# Patient Record
Sex: MALE | Race: BLACK OR AFRICAN AMERICAN | Employment: FULL TIME | URBAN - METROPOLITAN AREA
[De-identification: names, ages, dates, MRNs, and addresses within clinical notes are randomized per-mention and may not be internally consistent; named-entity substitution may affect disease eponyms.]

---

## 2019-04-01 ENCOUNTER — HOSPITAL ENCOUNTER (OUTPATIENT)
Dept: GENERAL RADIOLOGY | Age: 39
Discharge: HOME | End: 2019-04-01
Payer: COMMERCIAL

## 2019-04-01 DIAGNOSIS — S16.1XXA CERVICAL STRAIN: ICD-10-CM

## 2019-04-01 PROCEDURE — 72050 X-RAY EXAM NECK SPINE 4/5VWS: CPT

## 2019-04-09 ENCOUNTER — HOSPITAL ENCOUNTER (OUTPATIENT)
Dept: PHYSICAL THERAPY | Age: 39
Discharge: HOME | End: 2019-04-09
Payer: COMMERCIAL

## 2019-04-09 PROCEDURE — 97161 PT EVAL LOW COMPLEX 20 MIN: CPT

## 2019-04-09 PROCEDURE — 97110 THERAPEUTIC EXERCISES: CPT

## 2019-04-09 NOTE — PROGRESS NOTES
Request for use of Dry Needling/Intramuscular Manual Therapy Patient: Adonis Hagen     Referral Source: Kylee Gilbert MD 
Diagnosis: Neck pain [M54.2]      : 1980 Date of initial visit: 2019   Attended visits: 1  Missed Visits: 0 Based on findings from the physical therapy examination and evaluation, the evaluating therapist believes the patient, Adonis Hagen  would benefit from including Dry Needling as part of the plan of care. Dry needling is a treatment technique utilized in conjunction with other PT interventions to inactivate myofascial trigger points and the pain and dysfunction they cause. Dry Needling is an advanced procedure that requires additional training including greater than 54 hours of intensive course work. Physical Therapists at [address] [place] [address] are trained and/or certified through [employer] for their education. PROCEDURE: 
? Solid filament sterile needle (typically 0.3mm/30 gauge) inserted into a trigger point ? Repeated movements inactivate the trigger points, taking 30-60 seconds per site ? Typically consists of 1 dry needling session per week and a possible second treatment including muscle re-education, flexibility, strengthening and other manual techniques to facilitate the benefits of dry needling BENEFITS: 
? Inactivation of trigger points ? Decreased pain ? Increased muscle length ? Improved movement patterns ? Restoration of function POTENTIAL RISKS: 
? Post-needling soreness ? Infection ? Bruising/bleeding ? Penetration of a nerve ? Pneumothorax All treating PTs have been thoroughly educated in avoiding adverse reactions If you agree with this recommendation, please sign this form and fax it to us at [phone]. If you have questions or concerns regarding dry needling or any other treatment we may be providing, please contact us at [phone]. Thank you for allowing us to assist in the care of your patient. Chandrika Boucher, PT, DPT, ATC    4/9/2019 6:58 PM  
NOTE TO PHYSICIAN:  PLEASE COMPLETE THE ORDERS BELOW AND  
FAX TO [facility]: [phone] If you are unable to process this request in 24 hours please contact our office:  
[phone] I have read the above request and AGREE to the recommendation of including dry needling as part of the plan of care. Physicians signature: _________________________Date: _________Time:________

## 2019-04-09 NOTE — PROGRESS NOTES
PT DAILY TREATMENT NOTE/CERVICAL [id number] Patient Name: Herman Presley Date:2019 : 1980 [x]  Patient  Verified Payor: Yogesh Walker / Plan: [place] [employer] PPO / Product Type: PPO / In time:4:40pm  Out time:5:17pm 
Total Treatment Time (min): 37 Visit #: 1 of 8 Medicare/BCBS Only Total Timed Codes (min):  16 1:1 Treatment Time:  37 Treatment Area: Neck pain [M54.2] SUBJECTIVE Pain Level (0-10 scale): 3 
[]constant []intermittent []improving []worsening []no change since onset Any medication changes, allergies to medications, adverse drug reactions, diagnosis change, or new procedure performed?: [x] No    [] Yes (see summary sheet for update) Subjective functional status/changes:    
Pt reports increased neck pain starting several weeks ago that he believes was d/t moving his aunt out of her home or possibly d/t sleeping on the couch. He reports working, pushing, pulling will sometimes exacerbate his pain and reports general neck aching pain and stiffness. He does have a history significant for an upper cervical fracture with fixation hardware @ C2 and intermittent neck pain in the past associated with PT and dry needling several years ago that provided relief. He reports recent x-ray was mostly unremarkable but CT was ordered to provide more detail to make sure his hardware is ok (upcoming 2019). No treatment to date for current episode with exception of muscle relaxants that he reports he has not been using much. PLOF: working at the [employer], occasional neck pain, stiffness Limitations to PLOF: similar, limited lifting and pushing 2/2 concerns regarding neck Mechanism of Injury: see above Current symptoms/Complaints: see above Previous Treatment/Compliance: PCP, muscle relaxants PMHx/Surgical Hx: Hx cervical fracture 16 years ago, PT 3-4 years ago for neck pain with dry needling with good relief reported Work Hx: see intake Living Situation:  
Pt Goals: see intake Barriers: []pain []financial []time []transportation []other Motivation: appears motivated and cooperative Substance use: []Alcohol []Tobacco []other:  
FABQ Score: []low []elevate Cognition: A & O x 4    Other: OBJECTIVE/EXAMINATION Domestic Life:  
Activity/Recreational Limitations:  
Mobility:  
Self Care:  
 
21 min [x]Eval                  []Re-Eval  
 
10 min Therapeutic Exercise:  [x] See flow sheet :  
Rationale: increase ROM and increase strength to improve the patients ability to improve ease of daily activity. Self cAre: 6 minutes pt education regarding relevant anatomy, diagnosis, prognosis, plan of care, activity ,modification, self modality use. With 
 [] TE 
 [] TA 
 [] neuro 
 [] other: Patient Education: [x] Review HEP [] Progressed/Changed HEP based on:  
[] positioning   [] body mechanics   [] transfers   [] heat/ice application   
[] other:   
 
Other Objective/Functional Measures:  
 
Physical Therapy Evaluation Cervical Spine SUBJECTIVE Chief Complaint: 
 
Mechanism of injury: 
 
Symptoms Aggravated by: 
 [] Bending [] Sitting [] Standing [] Reaching Overhead 
 [x] Moving [] Cough [] Sneeze [] Eating 
 [] AM  [] PM  Lying:  [] sup   [] pro   [] sidelying 
 [] Other: 
  
Eased by:  
 [] Bending [] Sitting [] Standing Lying: [x] sup  [] pro  [] sidelying 
 [] Moving [] AM  [] PM  [] Other: 
  
General Health:  Red Flags Indicated? [] Yes    [x] No 
[] Yes [] No Recent weight change (If yes, due to dieting? [] Yes  [] No) [] Yes [] No Persistent cough 
[] Yes [] No Unremitting pain at night 
[] Yes [] No Dizziness 
[] Yes [] No Blurred vision 
[] Yes [] No Hands more cold or painful in cold weather 
[] Yes [] No Ringing in ears 
[] Yes [] No Difficulty swallowing 
[] Yes [] No Dysfunction of bowel or bladder 
[] Yes [] No Recent illness within past 3 weeks (i.e, cold, flu) 
[] Yes [] No Jaw pain Past History/Treatments: Diagnostic Tests: [] Lab work [x] X-rays    [] CT [] MRI     [] Other: 
Results: 4/1/2019 \"IMPRESSION: 
1. No convincing evidence for acute fracture or malalignment. 
-The fixation screw at the C2 vertebral body may encroach the posterior cortex 
at the level of the dens. This may be better evaluated with CT scan. 
  
2. Mild degenerative findings are as described. 
  
3. Straightening of the usual cervical lordosis, possibly positional or due to 
muscle strain/spasm. \"  
 
Functional Status Prior level of function: 
Present functional limitations: 
What position do you sleep in?: 
 
Headaches: Do you have headaches? [] Yes   [x] No 
How often do you get headaches? How long does the headache last? 
What aggravates it? What relieves it? Does the headache coincide with any other symptoms (visual disturbances, light sensitivity)? Where is the headache? Does it change locations? Other: OBJECTIVEPosture: [] WNL Head Position: forward head Shoulder/Scapular Position: 
C-Kyphosis:  [] increased   [] decreased C-Lordosis:   [] increased   [] decreased T-Kyphosis:  [] increased   [] decreased T-Lordosis:   [] increased   [] decreased TMJ: [] N/A [] Abnormal - ROM: 
 Palpation: 
 
Cervical Retraction: [] WNL    [] Abnormal: 
 
Shoulder/Scapular Screen: [x] WNL    [] Abnormal: 
 
Active Movements: [] N/A   [] Too acute   [] Other: ROM % AROM % PROM Comments:pain, area Forward flexion 38 Extension 36 SB right 28  Discomfort and stiffness SB left  26  Discomfort and stiffness Rotation right 54  Discomfort and stiffness Rotation left 50 Thoracic Spine: [] N/A    [x] WNL   [] Other: PROM: 
 
Palpation: 
[x] Min  [] Mod  [] Severe    Location: B cervical suboccipitals, scalenes, upper trap, levator scapulae, increased tone/turgor noted bilat 
[] Min  [] Mod  [] Severe    Location:  
[] Min  [] Mod  [] Severe    Location:  
 
Neuro Screen (myotome/dematome/felexes): [x] WNL Myotome Level Muscle Test Myotome Level Muscle Test  
C5 Shoulder Adduction - Deltoid C8 Finger Flexors C6 Wrist Extension T1 Finger Abduction - Interossei C7 Elbow Extension Comments: 
Upper Limb Tension Tests: [] N/A Ulnar: [] R    [] L    [] +    [x] - Median: [] R    [] L    [] +    [x] - Radial: [] R    [] L    [] +    [x] - Special Tests:  Cervical:  
     Vertebral Artery:  [] R    [] L    [] +    [] - Alar Ligament: [] R    [] L    [] +    [x] - Transverse Lig: [] R    [] L    [] +    [x] - Spurling's:  [] R    [] L    [] +    [x] - Distraction:  [] R    [] L    [] +    [x] - Compression: [] R    [] L    [] +    [x] - Thoracic Outlet Tests: [] N/A Adson's:  [] R    [] L    [] +    [] - Hyperabduction: [] R    [] L    [] +    [] - Javid's:  [] R    [] L    [] +    [] - Rashawn Curie:  [] R    [] L    [] +    [] - 
 
Diaphragmatic Breathing: [] Normal    [] Abnormal 
 
Muscle Flexibility: [] N/A Scalenes: [] WNL    [x] Tight    [x] R    [x] L Upper Trap: [] WNL    [x] Tight    [x] R    [x] L Levator: [] WNL    [x] Tight    [x] R    [x] L Pect. Minor: [] WNL    [] Tight    [] R    [] L Global Muscular Weakness: [] N/A Lower Trap:4+/5 Rhomboids:4+/5 Middle Trap: 4+/5 Serratus Ant: 5/5 Ext Rotators: 5/5 bilat Other: 
 
Other tests/comments: 
  
 
Pain Level (0-10 scale) post treatment: 3 
 
ASSESSMENT/Changes in Function: Per POC. Patient will continue to benefit from skilled PT services to modify and progress therapeutic interventions, address functional mobility deficits, address ROM deficits, analyze and address soft tissue restrictions, analyze and cue movement patterns and instruct in home and community integration to attain remaining goals. [x]  See Plan of Care 
[]  See progress note/recertification 
[]  See Discharge Summary Progress towards goals / Updated goals: 
Per POC.   
 
PLAN 
 [x]  Upgrade activities as tolerated     [x]  Continue plan of care 
[]  Update interventions per flow sheet      
[]  Discharge due to:_ 
[x]  Other:_ Myofascial interventions, gentle ROM, periscapular strength and mobility, DN PRN.   
 
Bridgett Essex, PT, DPT, ATC 4/9/2019  4:48 PM

## 2019-04-09 NOTE — PROGRESS NOTES
[address]  [address], [address] 
[phone] fax Plan of Care/ Statement of Necessity for Physical Therapy Services Patient name: Roni Mcmillan Start of Care: 2019 Referral source: Pedro Johnson MD : 1980 Medical Diagnosis: Neck pain [M54.2] Payor: Alix Pilon / Plan: [employer]  CAPITATED PT / Product Type: Commerical /  Onset Date:several weeks ago exacerbation Treatment Diagnosis: neck pain Prior Hospitalization: see medical history Provider#: [id number] Medications: Verified on Patient summary List  
 Comorbidities: Hx cervical fracture 16 years ago, PT 3-4 years ago for neck pain with dry needling with good relief reported Prior Level of Function: working at the [employer], occasional neck pain, stiffness The Plan of Care and following information is based on the information from the initial evaluation. Assessment/ key information: Pt is a 37year old male who reports increased neck pain starting several weeks ago that he believes was d/t moving his aunt out of her home or possibly d/t sleeping on the couch. He reports working, pushing, pulling will sometimes exacerbate his pain and reports general neck aching pain and stiffness. He does have a history significant for an upper cervical fracture with fixation hardware @ C2 and intermittent neck pain in the past associated with PT and dry needling several years ago that provided relief. Pt presents with signs and symptoms consistent with myofascial cervical pain with TTP Bilat cervical paraspinals, limited global cervical ROM. Pt will benefit from skilled PT management to address their impairments and improve their level of function.  Pt may benefit from use of dry needling, particularly given his positive response in the past. 
 
Evaluation Complexity History MEDIUM  Complexity : 1-2 comorbidities / personal factors will impact the outcome/ POC ; Examination LOW Complexity : 1-2 Standardized tests and measures addressing body structure, function, activity limitation and / or participation in recreation  ;Presentation LOW Complexity : Stable, uncomplicated  ;Clinical Decision Making MEDIUM Complexity : FOTO score of 26-74 Overall Complexity Rating: LOW Problem List: pain affecting function, decrease ROM, decrease activity tolerance and decrease flexibility/ joint mobility Treatment Plan may include any combination of the following: Therapeutic exercise, Therapeutic activities, Neuromuscular re-education, Physical agent/modality, Manual therapy, Patient education and Self Care training Patient / Family readiness to learn indicated by: asking questions, trying to perform skills and interest 
Persons(s) to be included in education: patient (P) Barriers to Learning/Limitations: None Patient Goal (s): Pain relief.  Patient Self Reported Health Status: good Rehabilitation Potential: good Short Term Goals: To be accomplished in 2 weeks: 1. Patient will report performance of HEP at least 2 times per day to facilitate improved outcomes and improved self management. Long Term Goals: To be accomplished in 4 weeks: 1. Patient will report FOTO score of 62 or better to indicate significant improvement in functional status. 2. Pt will demonstrate 55 degrees bilat cervical rotation AROM to improve ease of daily activity 3. Pt will report pain no greater than 1/10 with work tasks to facilitate full return to work duties. Frequency / Duration: Patient to be seen 2 times per week for 4 weeks. Patient/ Caregiver education and instruction: Diagnosis, prognosis, self care, activity modification and exercises 
 [x]  Plan of care has been reviewed with PTA Max Notice, PT, DPT, ATC 4/9/2019 6:28 PM 
 
________________________________________________________________________ I certify that the above Therapy Services are being furnished while the patient is under my care. I agree with the treatment plan and certify that this therapy is necessary. [de-identified] Signature:____________Date:_________TIME:________ 
 
[employer], Date and Time must be completed for valid certification ** Please sign and return to [address] [place], [address] 
[phone] fax

## 2019-04-11 ENCOUNTER — HOSPITAL ENCOUNTER (OUTPATIENT)
Dept: CT IMAGING | Age: 39
Discharge: HOME | End: 2019-04-11
Attending: PHYSICIAN ASSISTANT
Payer: COMMERCIAL

## 2019-04-11 DIAGNOSIS — Z98.890 OTHER SPECIFIED POSTPROCEDURAL STATES: ICD-10-CM

## 2019-04-11 PROCEDURE — 72125 CT NECK SPINE W/O DYE: CPT

## 2019-04-19 ENCOUNTER — HOSPITAL ENCOUNTER (OUTPATIENT)
Dept: PHYSICAL THERAPY | Age: 39
End: 2019-04-19
Payer: COMMERCIAL

## 2019-04-23 ENCOUNTER — HOSPITAL ENCOUNTER (OUTPATIENT)
Dept: PHYSICAL THERAPY | Age: 39
Discharge: HOME | End: 2019-04-23
Payer: COMMERCIAL

## 2019-04-23 PROCEDURE — 97110 THERAPEUTIC EXERCISES: CPT

## 2019-04-23 PROCEDURE — 97140 MANUAL THERAPY 1/> REGIONS: CPT

## 2019-04-23 NOTE — PROGRESS NOTES
PT DAILY TREATMENT NOTE  Patient Name: Reginaldo Alfredo Date:2019 : 1980 [x]  Patient  Verified Payor: Rosie Pollock / Plan: [address] [place] [address] Rd PT / Product Type: Commerical / In time:4:30  Out time:5:09 Total Treatment Time (min): 39 Visit #: 2 of 8 Treatment Area: Neck pain [M54.2] SUBJECTIVE Pain Level (0-10 scale): 1/10 Any medication changes, allergies to medications, adverse drug reactions, diagnosis change, or new procedure performed?: [x] No    [] Yes (see summary sheet for update) Subjective functional status/changes:   [] No changes reported \"I just woke up and took some pain pills, doing okay right now. \" OBJECTIVE Modality rationale: decrease pain and increase tissue extensibility to improve the patients ability to perform ADL's. Min Type Additional Details  
 [] Estim:  []Unatt       []IFC  []Premod []Other:  []w/ice   []w/heat Position: Location:  
 [] Estim: []Att    []TENS instruct  []NMES []Other:  []w/US   []w/ice   []w/heat Position: Location:  
 []  Traction: [] Cervical       []Lumbar 
                     [] Prone          []Supine []Intermittent   []Continuous Lbs: 
[] before manual 
[] after manual  
 []  Ultrasound: []Continuous   [] Pulsed []1MHz   []3MHz W/cm2: 
Location:  
 []  Iontophoresis with dexamethasone Location: [] Take home patch  
[] In clinic  
10 []  Ice     [x]  heat 
[]  Ice massage 
[]  Laser  
[]  Anodyne Position: Supine Location: C/S  
 []  Laser with stim 
[]  Other:  Position: Location:  
 []  Vasopneumatic Device Pressure:       [] lo [] med [] hi  
Temperature: [] lo [] med [] hi  
[] Skin assessment post-treatment:  []intact []redness- no adverse reaction 
  []redness  adverse reaction:  
 
21 min Therapeutic Exercise:  [x] See flow sheet :  
Rationale: increase ROM, increase strength and increase proprioception to improve the patients ability to perform ADL's and functional activities. 8 min Manual Therapy:  DTM (B) UT, lev scap, STM (B) C/S paraspinals. Lower C/S PA mobs. Rationale: decrease pain, increase ROM, increase tissue extensibility and decrease trigger points to improve ease with functional activities. With 
 [x] TE 
 [] TA 
 [] neuro 
 [] other: Patient Education: [x] Review HEP [] Progressed/Changed HEP based on:  
[] positioning   [] body mechanics   [] transfers   [] heat/ice application   
[] other:   
 
Other Objective/Functional Measures: Initiated exercises per flow sheet. Cueing throughout for proper exercise mechanics and posture. Pain Level (0-10 scale) post treatment: 1/10 ASSESSMENT/Changes in Function: First F/U visit. Compensatory (B) UT recruitment with several exercises. Patient will continue to benefit from skilled PT services to modify and progress therapeutic interventions, address functional mobility deficits, address ROM deficits, address strength deficits, analyze and address soft tissue restrictions and analyze and modify body mechanics/ergonomics to attain remaining goals. [x]  See Plan of Care 
[]  See progress note/recertification 
[]  See Discharge Summary Progress towards goals / Updated goals: 
 
Short Term Goals: To be accomplished in 2 weeks: 1. Patient will report performance of HEP at least 2 times per day to facilitate improved outcomes and improved self management. - Pt reports HEP compliance. 4/23/2019 
  
Long Term Goals: To be accomplished in 4 weeks: 1. Patient will report FOTO score of 62 or better to indicate significant improvement in functional status. 2. Pt will demonstrate 55 degrees bilat cervical rotation AROM to improve ease of daily activity 3. Pt will report pain no greater than 1/10 with work tasks to facilitate full return to work duties.  
 
PLAN 
 []  Upgrade activities as tolerated     [x]  Continue plan of care  
[]  Update interventions per flow sheet      
[]  Discharge due to:_ 
[]  Other:_   
 
Thao Vogel PTA 4/23/2019  4:23 PM 
 
Future Appointments Date Time Provider Roc Gonsales 4/23/2019  4:30 PM Alicia Zarco PTA [facility]  
4/26/2019  3:30 PM Jean Claude, [address] [facility]  
4/30/2019  4:30 PM Alicia Zarco PTA [facility]  
5/2/2019  4:30 PM Samantha Long [facility]  
5/7/2019  4:30 PM Caterina Myers [facility]  
5/9/2019  4:30 PM Samantha [facility]

## 2019-04-26 ENCOUNTER — HOSPITAL ENCOUNTER (OUTPATIENT)
Dept: PHYSICAL THERAPY | Age: 39
Discharge: HOME | End: 2019-04-26
Payer: COMMERCIAL

## 2019-04-26 PROCEDURE — 97110 THERAPEUTIC EXERCISES: CPT

## 2019-04-26 PROCEDURE — 97140 MANUAL THERAPY 1/> REGIONS: CPT

## 2019-04-26 PROCEDURE — 97112 NEUROMUSCULAR REEDUCATION: CPT

## 2019-04-26 NOTE — PROGRESS NOTES
PT DAILY TREATMENT NOTE  Patient Name: Azar Last Date:2019 : 1980 [x]  Patient  Verified Payor: Russell Lisa / Plan: [address] PT / Product Type: Commerical / In time:3:30  Out time:4:02 Total Treatment Time (min): 32 Visit #: 3 of 8 Treatment Area: Neck pain [M54.2] SUBJECTIVE Pain Level (0-10 scale): 1 Any medication changes, allergies to medications, adverse drug reactions, diagnosis change, or new procedure performed?: [x] No    [] Yes (see summary sheet for update) Subjective functional status/changes:   [] No changes reported \"Feeling alright\" pt reports no adverse response after last session OBJECTIVE Modality rationale: PD to improve the patients ability to Min Type Additional Details  
 [] Estim:  []Unatt       []IFC  []Premod []Other:  []w/ice   []w/heat Position: Location:  
 [] Estim: []Att    []TENS instruct  []NMES []Other:  []w/US   []w/ice   []w/heat Position: Location:  
 []  Traction: [] Cervical       []Lumbar 
                     [] Prone          []Supine []Intermittent   []Continuous Lbs: 
[] before manual 
[] after manual  
 []  Ultrasound: []Continuous   [] Pulsed []1MHz   []3MHz W/cm2: 
Location:  
 []  Iontophoresis with dexamethasone Location: [] Take home patch  
[] In clinic  
 []  Ice     []  heat 
[]  Ice massage 
[]  Laser  
[]  Anodyne Position: Location:  
 []  Laser with stim 
[]  Other:  Position: Location:  
 []  Vasopneumatic Device Pressure:       [] lo [] med [] hi  
Temperature: [] lo [] med [] hi  
[] Skin assessment post-treatment:  []intact []redness- no adverse reaction 
  []redness  adverse reaction:  
 
 
14 min Therapeutic Exercise:  [] See flow sheet :  
Rationale: increase ROM and increase strength to improve the patients ability to perform daily tasks and self care 10 min Neuromuscular Re-education:  []  See flow sheet :  
Rationale: improve coordination and increase proprioception  to improve the patients ability to perform ADLs with improved scapular stability 8 min Manual Therapy:  TPR left levator scap, T/S PA's, UPA's and rib springs, left scap mobs Rationale: increase ROM and increase tissue extensibility to improve ease of ADL performance With 
 [] TE 
 [] TA 
 [] neuro 
 [] other: Patient Education: [x] Review HEP [] Progressed/Changed HEP based on:  
[] positioning   [] body mechanics   [] transfers   [] heat/ice application   
[] other:   
 
Other Objective/Functional Measures: pt challenged maintaining SA with QP alternating UE lift Pain Level (0-10 scale) post treatment: 1 ASSESSMENT/Changes in Function: Pt with reports of improved muscle tension following sessions. He still reports feeling tight through lower cervical spine and thoracic region. Re-sent DN request today as pt would benefit through C-T region Patient will continue to benefit from skilled PT services to modify and progress therapeutic interventions, address functional mobility deficits, address ROM deficits, address strength deficits, analyze and address soft tissue restrictions, analyze and cue movement patterns and analyze and modify body mechanics/ergonomics to attain remaining goals. []  See Plan of Care 
[]  See progress note/recertification 
[]  See Discharge Summary Progress towards goals / Updated goals: 
Short Term Goals: To be accomplished in 2 weeks: 
             1. Patient will report performance of HEP at least 2 times per day to facilitate improved outcomes and improved self management. - Pt reports HEP compliance. 4/23/2019 
  
Long Term Goals: To be accomplished in 4 weeks: 
             1. Patient will report FOTO score of 62 or better to indicate significant improvement in functional status.              2. Pt will demonstrate 55 degrees bilat cervical rotation AROM to improve ease of daily activity              3. Pt will report pain no greater than 1/10 with work tasks to facilitate full return to work duties. PLAN 
[]  Upgrade activities as tolerated     [x]  Continue plan of care 
[]  Update interventions per flow sheet      
[]  Discharge due to:_ 
[]  Other:_ Kevin Jean DPT, CMTPT 4/26/2019  3:38 PM 
 
Future Appointments Date Time Provider Roc Gonsales 4/30/2019  4:30 PM [facility]  
5/2/2019  4:30 PM [facility]  
5/7/2019  4:30 PM Paola Yip PTA [facility]  
5/9/2019  4:30 PM [facility]

## 2019-04-26 NOTE — PROGRESS NOTES
Request for use of Dry Needling/Intramuscular Manual Therapy Patient: Roni Mcmillan     Referral Source: Pedro Johnson MD 
Diagnosis: Neck pain [M54.2]      : 1980 Date of initial visit: 2019   Attended visits: 3  Missed Visits: 0 Based on findings from the physical therapy examination and evaluation, the evaluating therapist believes the patient, Roni Mcmillan  would benefit from including Dry Needling as part of the plan of care. Dry needling is a treatment technique utilized in conjunction with other PT interventions to inactivate myofascial trigger points and the pain and dysfunction they cause. Dry Needling is an advanced procedure that requires additional training including greater than 54 hours of intensive course work. Physical Therapists at [address] [place] [address] are trained and/or certified through [employer] for their education. PROCEDURE: 
? Solid filament sterile needle (typically 0.3mm/30 gauge) inserted into a trigger point ? Repeated movements inactivate the trigger points, taking 30-60 seconds per site ? Typically consists of 1 dry needling session per week and a possible second treatment including muscle re-education, flexibility, strengthening and other manual techniques to facilitate the benefits of dry needling BENEFITS: 
? Inactivation of trigger points ? Decreased pain ? Increased muscle length ? Improved movement patterns ? Restoration of function POTENTIAL RISKS: 
? Post-needling soreness ? Infection ? Bruising/bleeding ? Penetration of a nerve ? Pneumothorax All treating PTs have been thoroughly educated in avoiding adverse reactions If you agree with this recommendation, please sign this form and fax it to us at [phone]. If you have questions or concerns regarding dry needling or any other treatment we may be providing, please contact us at [phone]. Thank you for allowing us to assist in the care of your patient. Timothy Edgar    4/26/2019 4:01 PM  
NOTE TO PHYSICIAN:  PLEASE COMPLETE THE ORDERS BELOW AND  
FAX TO [facility]: [phone] If you are unable to process this request in 24 hours please contact our office:  
[phone] I have read the above request and AGREE to the recommendation of including dry needling as part of the plan of care. Physicians signature: _________________________Date: _________Time:________

## 2019-04-30 ENCOUNTER — HOSPITAL ENCOUNTER (OUTPATIENT)
Dept: PHYSICAL THERAPY | Age: 39
Discharge: HOME | End: 2019-04-30
Payer: COMMERCIAL

## 2019-04-30 PROCEDURE — 97140 MANUAL THERAPY 1/> REGIONS: CPT

## 2019-04-30 PROCEDURE — 97110 THERAPEUTIC EXERCISES: CPT

## 2019-04-30 NOTE — PROGRESS NOTES
PT DAILY TREATMENT NOTE  Patient Name: Geovanny Morgan Date:2019 : 1980 [x]  Patient  Verified Payor: Gabriella Hutchinson / Plan: [address] [place] [address] Rd PT / Product Type: Commerical / In time:4:36  Out time:5:19 Total Treatment Time (min): 43 Visit #: 4 of 8 Treatment Area: Neck pain [M54.2] SUBJECTIVE Pain Level (0-10 scale): 1/10 Any medication changes, allergies to medications, adverse drug reactions, diagnosis change, or new procedure performed?: [x] No    [] Yes (see summary sheet for update) Subjective functional status/changes:   [] No changes reported \"It's doing a bit better, I've been taking it easy at work. \" OBJECTIVE Modality rationale: decrease pain and increase tissue extensibility to improve the patients ability to perform ADL's. Min Type Additional Details  
 [] Estim:  []Unatt       []IFC  []Premod []Other:  []w/ice   []w/heat Position: Location:  
 [] Estim: []Att    []TENS instruct  []NMES []Other:  []w/US   []w/ice   []w/heat Position: Location:  
 []  Traction: [] Cervical       []Lumbar 
                     [] Prone          []Supine []Intermittent   []Continuous Lbs: 
[] before manual 
[] after manual  
 []  Ultrasound: []Continuous   [] Pulsed []1MHz   []3MHz W/cm2: 
Location:  
 []  Iontophoresis with dexamethasone Location: [] Take home patch  
[] In clinic  
10 []  Ice     [x]  heat 
[]  Ice massage 
[]  Laser  
[]  Anodyne Position: Prone Location: C/S  
 []  Laser with stim 
[]  Other:  Position: Location:  
 []  Vasopneumatic Device Pressure:       [] lo [] med [] hi  
Temperature: [] lo [] med [] hi  
[] Skin assessment post-treatment:  []intact []redness- no adverse reaction 
  []redness  adverse reaction:  
 
25 min Therapeutic Exercise:  [x] See flow sheet :  
Rationale: increase ROM, increase strength and increase proprioception to improve the patients ability to perform ADL's. 
 
8 min Manual Therapy:  STM/DTM (B) UT, lev scap, C/S paraspinals and suboccipitals. Rationale: decrease pain, increase ROM, increase tissue extensibility and decrease trigger points to improve ease of performing functional activities. With 
 [x] TE 
 [] TA 
 [] neuro 
 [] other: Patient Education: [x] Review HEP [] Progressed/Changed HEP based on:  
[] positioning   [] body mechanics   [] transfers   [] heat/ice application   
[] other:   
 
Other Objective/Functional Measures: Increased QP SA punch to 15 reps. Pain Level (0-10 scale) post treatment: 0/10 ASSESSMENT/Changes in Function: Demonstrates improved form with QP SA punches. Awaiting MD to sign off on Dry-needling. Pt reports decreased pain since IE. Patient will continue to benefit from skilled PT services to modify and progress therapeutic interventions, address functional mobility deficits, address ROM deficits, address strength deficits, analyze and address soft tissue restrictions and analyze and modify body mechanics/ergonomics to attain remaining goals. [x]  See Plan of Care 
[]  See progress note/recertification 
[]  See Discharge Summary Progress towards goals / Updated goals: 
Short Term Goals: To be accomplished in 2 weeks: 
             1. Patient will report performance of HEP at least 2 times per day to facilitate improved outcomes and improved self management. - Pt reports HEP compliance. 4/23/2019 
  
Long Term Goals: To be accomplished in 4 weeks: 
             1. Patient will report FOTO score of 62 or better to indicate significant improvement in functional status.              2. Pt will demonstrate 55 degrees bilat cervical rotation AROM to improve ease of daily activity              3. Pt will report pain no greater than 1/10 with work tasks to facilitate full return to work duties.  
 
PLAN 
 []  Upgrade activities as tolerated     [x]  Continue plan of care 
[]  Update interventions per flow sheet      
[]  Discharge due to:_ 
[]  Other:_   
 
Kelle Cartagena, PTA 4/30/2019  4:46 PM 
 
Future Appointments Date Time Provider Roc Gonsales 5/2/2019  4:30 PM Jena Claude, [address] [facility]  
5/7/2019  4:30 PM [facility]  
5/9/2019  4:30 PM Vishnu Brandt [facility]

## 2019-05-02 ENCOUNTER — HOSPITAL ENCOUNTER (OUTPATIENT)
Dept: PHYSICAL THERAPY | Age: 39
End: 2019-05-02
Payer: COMMERCIAL

## 2019-05-07 ENCOUNTER — HOSPITAL ENCOUNTER (OUTPATIENT)
Dept: PHYSICAL THERAPY | Age: 39
Discharge: HOME | End: 2019-05-07
Payer: COMMERCIAL

## 2019-05-07 PROCEDURE — 97140 MANUAL THERAPY 1/> REGIONS: CPT

## 2019-05-07 PROCEDURE — 97110 THERAPEUTIC EXERCISES: CPT

## 2019-05-07 NOTE — PROGRESS NOTES
PT DAILY TREATMENT NOTE 10-18 Patient Name: Antonio Rowe Date:2019 : 1980 [x]  Patient  Verified Payor: Alida Diamond / Plan: [address] [place] [address] Jason PT / Product Type: Commerical / In time:4:30  Out time:5:08 Total Treatment Time (min): 38 Visit #: 5 of 8 Treatment Area: Neck pain [M54.2] SUBJECTIVE Pain Level (0-10 scale): 1 Any medication changes, allergies to medications, adverse drug reactions, diagnosis change, or new procedure performed?: [x] No    [] Yes (see summary sheet for update) Subjective functional status/changes:   [] No changes reported Pt reports feeling like he is getting better OBJECTIVE 30 min Therapeutic Exercise:  [x] See flow sheet :  
Rationale: increase ROM, increase strength and increase proprioception to improve the patients ability to perform ADLs 8 min Manual Therapy:  STM/DTM (B) UT, lev scap, C/S paraspinals and suboccipitals. Rationale: decrease pain, increase ROM and increase tissue extensibility to improve functional mobility With 
 [] TE 
 [] TA 
 [] neuro 
 [] other: Patient Education: [x] Review HEP [] Progressed/Changed HEP based on:  
[] positioning   [] body mechanics   [] transfers   [] heat/ice application   
[] other:   
 
Other Objective/Functional Measures: AROM c/s rot 55* bilaterally void of pain and limited endurance 
 
incr'd reps per flow sheet Pain Level (0-10 scale) post treatment: 0 
 
ASSESSMENT/Changes in Function: Pt performed well with therex today without incr'd pain. Pt demo's improved CROM void of pain but demo's limited endurance while attempting to maintain testing position.   
 
Patient will continue to benefit from skilled PT services to modify and progress therapeutic interventions, address functional mobility deficits, address ROM deficits, address strength deficits, analyze and address soft tissue restrictions, analyze and cue movement patterns and assess and modify postural abnormalities to attain remaining goals. []  See Plan of Care 
[]  See progress note/recertification 
[]  See Discharge Summary Progress towards goals / Updated goals: 
Short Term Goals: To be accomplished in 2 weeks: 
             1. Patient will report performance of HEP at least 2 times per day to facilitate improved outcomes and improved self management. - Pt reports HEP compliance. 4/23/2019 
  
Long Term Goals: To be accomplished in 4 weeks: 
             1. Patient will report FOTO score of 62 or better to indicate significant improvement in functional status.              2. Pt will demonstrate 55 degrees bilat cervical rotation AROM to improve ease of daily activity MET- c/s rot leticia 55* void of pain 5/7/19 
             3. Pt will report pain no greater than 1/10 with work tasks to facilitate full return to work duties. PLAN 
[]  Upgrade activities as tolerated     [x]  Continue plan of care 
[]  Update interventions per flow sheet      
[]  Discharge due to:_ 
[]  Other:_ Nicole Sarmiento PTA 5/7/2019  4:31 PM 
 
Future Appointments Date Time Provider Roc Gonsales 5/9/2019  4:30 PM Samara Briseno [id number]

## 2019-05-09 ENCOUNTER — HOSPITAL ENCOUNTER (OUTPATIENT)
Dept: PHYSICAL THERAPY | Age: 39
Discharge: HOME | End: 2019-05-09
Payer: COMMERCIAL

## 2019-05-09 PROCEDURE — 97110 THERAPEUTIC EXERCISES: CPT

## 2019-05-09 PROCEDURE — 97112 NEUROMUSCULAR REEDUCATION: CPT

## 2019-05-09 PROCEDURE — 97140 MANUAL THERAPY 1/> REGIONS: CPT

## 2019-05-09 NOTE — PROGRESS NOTES
[facility] [address] [place] [address] [place], [address] 
[phone] fax Physical Therapy Progress Note Patient name: Zeb Lea Start of Care: 2019 Referral source: Bailey Brice MD : 1980 Medical Diagnosis: Neck pain [M54.2] Payor: Kenneth Reny / Plan: [employer] / Product Type: Commerical /  Onset Date:several weeks ago exacerbation Treatment Diagnosis: neck pain Prior Hospitalization: see medical history Provider#: [id number] Medications: Verified on Patient summary List  
 Comorbidities: Hx cervical fracture 16 years ago, PT 3-4 years ago for neck pain with dry needling with good relief reported Prior Level of Function: working at the [employer], occasional neck pain, stiffness Visits from Start of Care: 6    Missed Visits: 2 Established Goals:        Excellent         Good         Limited            None 
[x] Increased ROM   []  [x]  []  [] 
[] Increased Strength  []  []  []  [] 
[] Increased Mobility  []  []  []  []  
[x] Decreased Pain   []  [x]  []  [] 
[] Decreased Swelling  []  []  []  [] 
 
Key Functional Changes: improved cervical mobility and overall pain reports Short Term Goals: To be accomplished in 2 weeks: 
             1. Patient will report performance of HEP at least 2 times per day to facilitate improved outcomes and improved self management. - Pt reports HEP compliance. 2019 
  
Long Term Goals: To be accomplished in 4 weeks: 
             1. Patient will report FOTO score of 62 or better to indicate significant improvement in functional status.  Not met decreased to 49 19 
             2. Pt will demonstrate 55 degrees bilat cervical rotation AROM to improve ease of daily activity MET- c/s rot leticia 55* void of pain 19 
             3. Pt will report pain no greater than 1/10 with work tasks to facilitate full return to work duties. Not met 5/10 at work 5/9/19 Updated Goals: to be achieved in 3 weeks: 1. Patient will report FOTO score of 62 or better to indicate significant improvement in functional status. 2. Pt will report pain no greater than 1/10 with work tasks to facilitate full return to work duties. 3. Pt will demonstrate neutral cervical and thoracic spine with QP exercises void of cuing to improve postural stability with ADLs. ASSESSMENT/RECOMMENDATIONS: Pt with improved pain following initiation of DN today. He reports overall improved pain levels and demonstrates improved cervical mobility. FOTO score declined today which is not consistent with subjective and objective findings. Will continue PT for 3 weeks to progress with cervicothoracic mechanics and improved work ease. [x]Continue therapy per initial plan/protocol at a frequency of  2 x per week for 3 weeks []Continue therapy with the following recommended changes:_____________________      _____________________________________________________________________ []Discontinue therapy progressing towards or have reached established goals []Discontinue therapy due to lack of appreciable progress towards goals []Discontinue therapy due to lack of attendance or compliance []Await Physician's recommendations/decisions regarding therapy []Other:________________________________________________________________ Thank you for this referral.   
Shahram Rg DPT, CMTPT 5/9/2019 6:01 PM 
NOTE TO PHYSICIAN:  PLEASE COMPLETE THE ORDERS BELOW AND  
FAX TO [facility]: [phone] If you are unable to process this request in 24 hours please contact our office: [phone] ? I have read the above report and request that my patient continue as recommended.  
? I have read the above report and request that my patient continue therapy with the following changes/special instructions:__________________________________________________________ ? I have read the above report and request that my patient be discharged from therapy. Physicians signature: ______________________________Date: ______Time:______

## 2019-05-09 NOTE — PROGRESS NOTES
PT DAILY TREATMENT NOTE  Patient Name: Boyd Child Date:2019 : 1980 [x]  Patient  Verified Payor: Regulo Robles / Plan: [address] [place] [address] Rd PT / Product Type: Commerical / In time:4:30  Out time:5:19 Total Treatment Time (min): 49 Visit #: 6 of 8 Treatment Area: Neck pain [M54.2] SUBJECTIVE Pain Level (0-10 scale): 1 Any medication changes, allergies to medications, adverse drug reactions, diagnosis change, or new procedure performed?: [x] No    [] Yes (see summary sheet for update) Subjective functional status/changes:   [] No changes reported Pt reports he is experiencing some discomfort through left levator scap region of late OBJECTIVE Modality rationale: decrease inflammation and decrease pain to improve the patients ability to perform ADLs with less post needling soreness Min Type Additional Details  
 [] Estim:  []Unatt       []IFC  []Premod []Other:  []w/ice   []w/heat Position: Location:  
 [] Estim: []Att    []TENS instruct  []NMES []Other:  []w/US   []w/ice   []w/heat Position: Location:  
 []  Traction: [] Cervical       []Lumbar 
                     [] Prone          []Supine []Intermittent   []Continuous Lbs: 
[] before manual 
[] after manual  
 []  Ultrasound: []Continuous   [] Pulsed []1MHz   []3MHz W/cm2: 
Location:  
 []  Iontophoresis with dexamethasone Location: [] Take home patch  
[] In clinic  
10 [x]  Ice     []  heat 
[]  Ice massage 
[]  Laser  
[]  Anodyne Position: seated Location: left shoulder  
 []  Laser with stim 
[]  Other:  Position: Location:  
 []  Vasopneumatic Device Pressure:       [] lo [] med [] hi  
Temperature: [] lo [] med [] hi  
[] Skin assessment post-treatment:  []intact []redness- no adverse reaction 
  []redness  adverse reaction:  
 
19 min Therapeutic Exercise:  [] See flow sheet :  
 Rationale: increase ROM and increase strength to improve the patients ability to perform daily tasks 12 min Neuromuscular Re-education:  []  See flow sheet :  
Rationale: improve coordination and increase proprioception  to improve the patients ability to perform functional tasks with improved scapular and cervical mechanics Dry Needling Procedure Note Procedure: An intramuscular manual therapy (dry needling) and a neuro-muscular re-education treatment was done to deactivate myofascial trigger points with a 30 gauge filament needle under aseptic technique. Indications: 
[x] Myofascial pain and dysfunction [] Muscled spasms [] Myalgia/myositis   [] Muscle cramps [x] Muscle imbalances  [] Temporomandibular Dysfunction 
[] Other: 
 
Chart reviewed for the following: 
Jackson LEWIS, have reviewed the medical history, summary list and precautions/contraindications for Herman Presley. TIME OUT performed immediately prior to start of procedure: 
Jackson LEWIS, have performed the following reviews on Herman Presley prior to the start of the session:     
[x] Verified patient identification by name and date of birth   
[x] Agreement on all muscles being treated was verified  
[x] Purpose of dry needling, side effects, possible complications, risks and benefits were explained to the patient [x] Procedure site(s) verified 
[x] Patient was positioned for comfort and draped for privacy [x] Informed Consent was signed (initial visit) and verified verbally (subsequent visits) [] Patient was instructed on the need to report the use of blood thinners and/or immunosuppressant medications [x] How to respond to possible adverse effects of treatment 
[x] Self treatment of post needling soreness: ice, heat (moist heat, heat wraps) and stretching 
[x] Opportunity was given to ask any questions, all questions were answered Time: 4:46 Date of procedure: 5/9/2019 Treatment: The following muscles were treated today with intramuscular dry needling 
[] Left [] Right Masster 
[] Left [] Right Temporalis 
[] Left [] Right Zygomaticus Major / Minor 
[] Left [] Right Lateral Pterygoid 
[] Left [] Right Medial Pterygoid 
[] Left [] Right Digastric Post / Anterior Belly 
[] Left [] Right Sternocleidomastoid 
[] Left [] Right Scalene Anterior / Medial / Posterior 
[] Left [] Right Extra Laryngeal Muscles 
[] Left [] Right Upper Trapezius 
[] Left [] Right Middle Trapezius 
[] Left [] Right Lower Trapezius 
[] Left [] Right Oblique Capitis Inferior 
[] Left [] Right Splenius Capitis / Cervicis 
[] Left [] Right Semispinalis: Capitis / Cervicis 
[] Left [] Right Multifidi / Rotatores Cervicis / Thoracic 
[] Left [] Right Longissimus Thoracis / Illiocostalis [x] Left [] Right Levator Scapulae 
[] Left [] Right Supraspinatus / Infraspinatus 
[] Left [] Right Teres Major / Minor 
[] Left [] Right Rhomboids / Serratus posterior superior 
[] Left [] Right Pectoralis Major / Minor 
[] Left [] Right Serratus Anterior 
[] Left [] Right Latissimus Dorsi 
[] Left [] Right Subscapularis 
[] Left [] Right Coracobrachialis 
[] Left [] Right Biceps Brachii 
[] Left [] Right Deltoid: Anterior / Medial / Posterior 
[] Left [] Right Brachialis 
[] Left [] Right Triceps 
[] Left [] Right Brachioradialis 
[] Left [] Right Extensor Carpi Radialis Brevis / Extensor Carpi Radialis Longus    [] Left [] Right  Extensor digitorum 
[] Left [] Right Supinator / Pronator Teres 
[] Left [] Right Flexor Carpi Radialis/ Flexor Carpi Ulnaris  
[] Left [] Right  Flexor Digitorum Superficialis/ Flexor Digitorum Profundus 
[] Left [] Right Flexor Pollicis Longus / Flexor Pollicis Brevis / Palmaris Longus 
[] Left [] Right Abductor Pollicis Longus / Abductor Pollicis Brevis 
[] Left [] Right Opponens Pollicis / Adductor Pollicis 
[] Left [] Right Dorsal / Palmar Interossei / Lumbricalis [] Left [] Right Abductor Digiti Minimi / Opponens Digiti Minimi Patient's response to today's treatment: 
[x] Latent Twitch Response     [x] Muscle relaxation [] Pain Relief 
[x] Post needling soreness    [x] without complications 
[] Increased Range of Motion   [] Decreased headaches   
[] Decreased Tinnitus [] Other:  
 
Performed by: Angelina Kumar DPT, [facility] 8 min Manual Therapy:  T/s PA's and rib springs Rationale: increase ROM and increase tissue extensibility to perform ADLs With 
 [] TE 
 [] TA 
 [] neuro 
 [] other: Patient Education: [x] Review HEP [] Progressed/Changed HEP based on:  
[] positioning   [] body mechanics   [] transfers   [] heat/ice application   
[] other:   
 
Other Objective/Functional Measures: pt FOTO score declined 5 points to 49, not indicative of subjective reports of improved symptoms Pain Level (0-10 scale) post treatment: 0 
 
ASSESSMENT/Changes in Function: Pt with improved pain following initiation of DN today. He reports overall improved pain levels and demonstrates improved cervical mobility. FOTO score declined today which is not consistent with subjective and objective findings. Will continue PT for 3 weeks to progress with cervicothoracic mechanics and improved work ease. Patient will continue to benefit from skilled PT services to modify and progress therapeutic interventions, address functional mobility deficits, address ROM deficits, address strength deficits, analyze and address soft tissue restrictions, analyze and cue movement patterns, analyze and modify body mechanics/ergonomics and assess and modify postural abnormalities to attain remaining goals. []  See Plan of Care 
[]  See progress note/recertification 
[]  See Discharge Summary Progress towards goals / Updated goals: 
Short Term Goals: To be accomplished in 2 weeks: 
             1.  Patient will report performance of HEP at least 2 times per day to facilitate improved outcomes and improved self management. - Pt reports HEP compliance. 4/23/2019 
  
Long Term Goals: To be accomplished in 4 weeks: 
             1. Patient will report FOTO score of 62 or better to indicate significant improvement in functional status. Not met decreased to 49 5/9/19 
             2. Pt will demonstrate 55 degrees bilat cervical rotation AROM to improve ease of daily activity MET- c/s rot leticia 55* void of pain 5/7/19 
             3. Pt will report pain no greater than 1/10 with work tasks to facilitate full return to work duties. Not met 5/10 at work 5/9/19 PLAN [x]  Upgrade activities as tolerated     []  Continue plan of care 
[]  Update interventions per flow sheet      
[]  Discharge due to:_ 
[]  Other:_ Casper Carpenter DPT, CMTPT 5/9/2019  4:40 PM 
 
Future Appointments Date Time Provider Roc Gonsales 5/14/2019  4:30 PM Ada Johnson PTA [facility]  
5/17/2019  2:00 PM Javier Mazariegos [facility]

## 2019-05-14 ENCOUNTER — HOSPITAL ENCOUNTER (OUTPATIENT)
Dept: PHYSICAL THERAPY | Age: 39
Discharge: HOME | End: 2019-05-14
Payer: COMMERCIAL

## 2019-05-14 PROCEDURE — 97110 THERAPEUTIC EXERCISES: CPT

## 2019-05-14 PROCEDURE — 97140 MANUAL THERAPY 1/> REGIONS: CPT

## 2019-05-14 NOTE — PROGRESS NOTES
PT DAILY TREATMENT NOTE  Patient Name: Azar Last Date:2019 : 1980 [x]  Patient  Verified Payor: Russell Lisa / Plan: [address] PT / Product Type: Commerical / In time:4:21  Out time:4:54 Total Treatment Time (min): 33 Visit #: 1 of 6 Treatment Area: Neck pain [M54.2] SUBJECTIVE Pain Level (0-10 scale): 1/10 Any medication changes, allergies to medications, adverse drug reactions, diagnosis change, or new procedure performed?: [x] No    [] Yes (see summary sheet for update) Subjective functional status/changes:   [] No changes reported \"The needling got some tension out of the shoulder. \" OBJECTIVE 25 min Therapeutic Exercise:  [x] See flow sheet :  
Rationale: increase ROM, increase strength and increase proprioception to improve the patients ability to perform ADL's. 
 
8 min Manual Therapy:  STM/DTM (B) UT, lev scap, C/S paraspinals, suboccipitals. Rationale: decrease pain, increase ROM, increase tissue extensibility and decrease trigger points to improve ease of performing functional and work related activities. With 
 [x] TE 
 [] TA 
 [] neuro 
 [] other: Patient Education: [x] Review HEP [] Progressed/Changed HEP based on:  
[] positioning   [] body mechanics   [] transfers   [] heat/ice application   
[] other:   
 
Other Objective/Functional Measures: Increased several exercises to 15 reps. Added 1# to prone shoulder letter series. Pain Level (0-10 scale) post treatment: 0/10 ASSESSMENT/Changes in Function: Tolerated additional resistance with prone PRE's. Pt denied pain post-treatment. Patient will continue to benefit from skilled PT services to modify and progress therapeutic interventions, address functional mobility deficits, address ROM deficits, address strength deficits, analyze and address soft tissue restrictions and analyze and modify body mechanics/ergonomics to attain remaining goals. [x]  See Plan of Care []  See progress note/recertification 
[]  See Discharge Summary Progress towards goals / Updated goals: 
Updated Goals: to be achieved in 3 weeks: 1. Patient will report FOTO score of 62 or better to indicate significant improvement in functional status.  
2. Pt will report pain no greater than 1/10 with work tasks to facilitate full return to work duties. 3. Pt will demonstrate neutral cervical and thoracic spine with QP exercises void of cuing to improve postural stability with ADLs. PLAN 
[]  Upgrade activities as tolerated     [x]  Continue plan of care 
[]  Update interventions per flow sheet      
[]  Discharge due to:_ 
[]  Other:_   
 
Migdalia Fine PTA 5/14/2019  4:24 PM 
 
Future Appointments Date Time Provider Roc Gonsales 5/14/2019  4:30 PM Ramos Levine PTA [facility]  
5/17/2019  2:00 PM Julienne Cobian [facility]  
5/20/2019  3:00 PM Ramos Levine PTA [facility]  
5/24/2019  4:30 PM Julienne Cobian [facility]  
5/28/2019  3:30 PM Ramos Levine PTA [facility]  
5/31/2019  4:00 PM Julienne Cobian [facility]

## 2019-05-17 ENCOUNTER — HOSPITAL ENCOUNTER (OUTPATIENT)
Dept: PHYSICAL THERAPY | Age: 39
Discharge: HOME | End: 2019-05-17
Payer: COMMERCIAL

## 2019-05-17 PROCEDURE — 97112 NEUROMUSCULAR REEDUCATION: CPT

## 2019-05-17 PROCEDURE — 97140 MANUAL THERAPY 1/> REGIONS: CPT

## 2019-05-17 PROCEDURE — 97110 THERAPEUTIC EXERCISES: CPT

## 2019-05-17 NOTE — PROGRESS NOTES
PT DAILY TREATMENT NOTE  Patient Name: Ned Dhillon Date:2019 : 1980 [x]  Patient  Verified Payor: Carmenza Spencer / Plan: [address] [place] [address] Rd PT / Product Type: Commerical / In time:1:58  Out time:2:45 Total Treatment Time (min): 47 Visit #: 2 of 6 Treatment Area: Neck pain [M54.2] SUBJECTIVE Pain Level (0-10 scale): 1 Any medication changes, allergies to medications, adverse drug reactions, diagnosis change, or new procedure performed?: [x] No    [] Yes (see summary sheet for update) Subjective functional status/changes:   [] No changes reported \"The needling loosened it up, don't feel too much today\" OBJECTIVE Modality rationale: decrease inflammation and decrease pain to improve the patients ability to perform ADLs with less post needling soreness Min Type Additional Details  
 [] Estim:  []Unatt       []IFC  []Premod []Other:  []w/ice   []w/heat Position: Location:  
 [] Estim: []Att    []TENS instruct  []NMES []Other:  []w/US   []w/ice   []w/heat Position: Location:  
 []  Traction: [] Cervical       []Lumbar 
                     [] Prone          []Supine []Intermittent   []Continuous Lbs: 
[] before manual 
[] after manual  
 []  Ultrasound: []Continuous   [] Pulsed []1MHz   []3MHz W/cm2: 
Location:  
 []  Iontophoresis with dexamethasone Location: [] Take home patch  
[] In clinic  
10 [x]  Ice     []  heat 
[]  Ice massage 
[]  Laser  
[]  Anodyne Position: prone Location: cervical  
 []  Laser with stim 
[]  Other:  Position: Location:  
 []  Vasopneumatic Device Pressure:       [] lo [] med [] hi  
Temperature: [] lo [] med [] hi  
[] Skin assessment post-treatment:  []intact []redness- no adverse reaction 
  []redness  adverse reaction:  
 
 
21 min Therapeutic Exercise:  [] See flow sheet :  
 Rationale: increase ROM and increase strength to improve the patients ability to perform daily tasks and self care 8 min Neuromuscular Re-education:  []  See flow sheet :  
Rationale: improve coordination and increase proprioception  to improve the patients ability to perform daily tasks with improved scapular stability 8 min Manual Therapy:  T/s PA's and rib springs Rationale: increase ROM and increase tissue extensibility to improve ease of ADLs Dry Needling Procedure Note Procedure: An intramuscular manual therapy (dry needling) and a neuro-muscular re-education treatment was done to deactivate myofascial trigger points with a 30 gauge filament needle under aseptic technique. Indications: 
[x] Myofascial pain and dysfunction [] Muscled spasms [] Myalgia/myositis   [] Muscle cramps [x] Muscle imbalances  [] Temporomandibular Dysfunction 
[] Other: 
 
Chart reviewed for the following: 
Don LEWIS, have reviewed the medical history, summary list and precautions/contraindications for Herman Presley. TIME OUT performed immediately prior to start of procedure: 
Don LEWIS, have performed the following reviews on Herman Presley prior to the start of the session:     
[x] Verified patient identification by name and date of birth   
[x] Agreement on all muscles being treated was verified  
[x] Purpose of dry needling, side effects, possible complications, risks and benefits were explained to the patient [x] Procedure site(s) verified 
[x] Patient was positioned for comfort and draped for privacy [x] Informed Consent was signed (initial visit) and verified verbally (subsequent visits) [x] Patient was instructed on the need to report the use of blood thinners and/or immunosuppressant medications [x] How to respond to possible adverse effects of treatment 
[x] Self treatment of post needling soreness: ice, heat (moist heat, heat wraps) and stretching [x] Opportunity was given to ask any questions, all questions were answered Time: 2:08 Date of procedure: 5/17/2019 Treatment: The following muscles were treated today with intramuscular dry needling 
[] Left [] Right Masster 
[] Left [] Right Temporalis 
[] Left [] Right Zygomaticus Major / Minor 
[] Left [] Right Lateral Pterygoid 
[] Left [] Right Medial Pterygoid 
[] Left [] Right Digastric Post / Anterior Belly 
[] Left [] Right Sternocleidomastoid 
[] Left [] Right Scalene Anterior / Medial / Posterior 
[] Left [] Right Extra Laryngeal Muscles 
[] Left [] Right Upper Trapezius 
[] Left [] Right Middle Trapezius 
[] Left [] Right Lower Trapezius 
[] Left [] Right Oblique Capitis Inferior [x] Left [x] Right Splenius Capitis / Cervicis [x] Left [x] Right Semispinalis: Capitis / Cervicis 
[] Left [] Right Multifidi / Rotatores Cervicis / Thoracic 
[] Left [] Right Longissimus Thoracis / Illiocostalis 
[] Left [x] Right Levator Scapulae 
[] Left [] Right Supraspinatus / Infraspinatus 
[] Left [] Right Teres Major / Minor 
[] Left [] Right Rhomboids / Serratus posterior superior 
[] Left [] Right Pectoralis Major / Minor 
[] Left [] Right Serratus Anterior 
[] Left [] Right Latissimus Dorsi 
[] Left [] Right Subscapularis 
[] Left [] Right Coracobrachialis 
[] Left [] Right Biceps Brachii 
[] Left [] Right Deltoid: Anterior / Medial / Posterior 
[] Left [] Right Brachialis 
[] Left [] Right Triceps 
[] Left [] Right Brachioradialis 
[] Left [] Right Extensor Carpi Radialis Brevis / Extensor Carpi Radialis Longus    [] Left [] Right  Extensor digitorum 
[] Left [] Right Supinator / Pronator Teres 
[] Left [] Right Flexor Carpi Radialis/ Flexor Carpi Ulnaris  
[] Left [] Right  Flexor Digitorum Superficialis/ Flexor Digitorum Profundus 
[] Left [] Right Flexor Pollicis Longus / Flexor Pollicis Brevis / Palmaris Longus 
[] Left [] Right Abductor Pollicis Longus / Abductor Pollicis Brevis [] Left [] Right Opponens Pollicis / Adductor Pollicis 
[] Left [] Right Dorsal / Palmar Interossei / Lumbricalis 
[] Left [] Right Abductor Digiti Minimi / Opponens Digiti Minimi Patient's response to today's treatment: 
[x] Latent Twitch Response     [] Muscle relaxation [] Pain Relief 
[x] Post needling soreness    [x] without complications 
[] Increased Range of Motion   [] Decreased headaches   
[] Decreased Tinnitus [] Other:  
 
Performed by: Tacho Baca, RACH, HORTENCIAPT With 
 [] TE 
 [] TA 
 [] neuro 
 [] other: Patient Education: [x] Review HEP [] Progressed/Changed HEP based on:  
[] positioning   [] body mechanics   [] transfers   [] heat/ice application   
[] other:   
 
Other Objective/Functional Measures:   
 
Pain Level (0-10 scale) post treatment: 0 
 
ASSESSMENT/Changes in Function: Pt progressing well in regards to mobility and pain at this time. Advancing with posterior chain stability. Progress further with stability work as tolerated Patient will continue to benefit from skilled PT services to modify and progress therapeutic interventions, address functional mobility deficits, address ROM deficits, address strength deficits, analyze and address soft tissue restrictions, analyze and cue movement patterns and analyze and modify body mechanics/ergonomics to attain remaining goals. []  See Plan of Care 
[]  See progress note/recertification 
[]  See Discharge Summary Updated Goals: to be achieved in 3 weeks: 1. Patient will report FOTO score of 62 or better to indicate significant improvement in functional status.  
2. Pt will report pain no greater than 1/10 with work tasks to facilitate full return to work duties. 3. Pt will demonstrate neutral cervical and thoracic spine with QP exercises void of cuing to improve postural stability with ADLs. Met following initial cue for cervical elongation 5/17/19 PLAN 
 []  Upgrade activities as tolerated     []  Continue plan of care 
[]  Update interventions per flow sheet      
[]  Discharge due to:_ 
[]  Other:_ Tacho Baca DPT, CMTPT 5/17/2019  2:00 PM 
 
Future Appointments Date Time Provider Roc Gonsales 5/20/2019  3:00 PM [facility]  
[facility]  [facility]  
5[facility]28/2019  3:30 PM [facility]  
5/31/2019  4:00 PM [facility]

## 2019-05-20 ENCOUNTER — HOSPITAL ENCOUNTER (OUTPATIENT)
Dept: PHYSICAL THERAPY | Age: 39
Discharge: HOME | End: 2019-05-20
Payer: COMMERCIAL

## 2019-05-20 PROCEDURE — 97140 MANUAL THERAPY 1/> REGIONS: CPT

## 2019-05-20 PROCEDURE — 97110 THERAPEUTIC EXERCISES: CPT

## 2019-05-28 ENCOUNTER — HOSPITAL ENCOUNTER (OUTPATIENT)
Dept: PHYSICAL THERAPY | Age: 39
Discharge: HOME | End: 2019-05-28
Payer: COMMERCIAL

## 2019-05-28 PROCEDURE — 97110 THERAPEUTIC EXERCISES: CPT

## 2019-05-28 PROCEDURE — 97140 MANUAL THERAPY 1/> REGIONS: CPT

## 2019-05-28 NOTE — PROGRESS NOTES
PT DAILY TREATMENT NOTE     Patient Name: Daksha Marie  Date:2019  : 1980  [x]  Patient  Verified  Payor: Tj Myers / [place]: [place] [employer]  [facility] PT / Product Type: Commerical /    In time:3:24  Out time:3:55  Total Treatment Time (min): 31  Visit #: 4 of 6    Treatment Area: Neck pain [M54.2]    SUBJECTIVE   Pain Level (0-10 scale): 0/10   Any medication changes, allergies to medications, adverse drug reactions, diagnosis change, or new procedure performed?: [x] No    [] Yes (see summary sheet for update)  Subjective functional status/changes:   [] No changes reported  \"No pain today. \"    OBJECTIVE    23 min Therapeutic Exercise:  [x] See flow sheet :   Rationale: increase ROM, increase strength and increase proprioception to improve the patients ability to perform ADL's.    8 min Manual Therapy:  STM/DTM (B) UT, lev scap and C/S paraspinals. Rationale: decrease pain, increase ROM, increase tissue extensibility and decrease trigger points to improve ease of performing functional activities. With   [x] TE   [] TA   [] neuro   [] other: Patient Education: [x] Review HEP    [] Progressed/Changed HEP based on:   [] positioning   [] body mechanics   [] transfers   [] heat/ice application    [] other:      Other Objective/Functional Measures: Updated prone scap series to prone on chair 10 reps each. Pain Level (0-10 scale) post treatment: 0/10    ASSESSMENT/Changes in Function: Difficulty maintaining SA recruitment with new prone on chair series. Demonstrates improved posture and stabilization with QP series. Continue    Patient will continue to benefit from skilled PT services to modify and progress therapeutic interventions, address functional mobility deficits, address ROM deficits, address strength deficits, analyze and address soft tissue restrictions and analyze and modify body mechanics/ergonomics to attain remaining goals.      [x]  See Plan of Care  []  See progress note/recertification  []  See Discharge Summary         Progress towards goals / Updated goals:  Updated Goals: to be achieved in 3 weeks:  1. Patient will report FOTO score of 62 or better to indicate significant improvement in functional status.   2. Pt will report pain no greater than 1/10 with work tasks to facilitate full return to work duties. - Pt reports having no pain today, performed full work shift. 5/20/2019  3. Pt will demonstrate neutral cervical and thoracic spine with QP exercises void of cuing to improve postural stability with ADLs.  Met following initial cue for cervical elongation 5/17/19     PLAN  []  Upgrade activities as tolerated     [x]  Continue plan of care  []  Update interventions per flow sheet       []  Discharge due to:_  []  Other:_      Austyn García PTA 5/28/2019  3:27 PM    Future Appointments   Date Time Provider Roc Gonsales   5/28/2019  3:30 PM Shukri Patterson PTA [facility]   5/31/2019  4:00 PM Sarah Castro [facility]

## 2019-05-31 ENCOUNTER — HOSPITAL ENCOUNTER (OUTPATIENT)
Dept: PHYSICAL THERAPY | Age: 39
Discharge: HOME | End: 2019-05-31
Payer: COMMERCIAL

## 2019-05-31 PROCEDURE — 97110 THERAPEUTIC EXERCISES: CPT

## 2019-05-31 PROCEDURE — 97112 NEUROMUSCULAR REEDUCATION: CPT

## 2019-05-31 NOTE — PROGRESS NOTES
PT DISCHARGE DAILY NOTE AND [id number]    Date:2019  Patient name: Martínez Mace of Care: 2019   Referral Salud Zhu MD : 1980                Medical Diagnosis: Neck pain [M54.2]  Payor: [facility] / Plan: [place] [employer]  [facility] / Product Type: Commerical /  Onset Date:several weeks ago exacerbation                Treatment Diagnosis: neck pain   Prior Hospitalization: see medical history Provider#: [id number]   Medications: Verified on Patient summary List    Comorbidities: Hx cervical fracture 16 years ago, PT 3-4 years ago for neck pain with dry needling with good relief reported   Prior Level of Function: working at the [employer], occasional neck pain, stiffness  Visits from Start of Care: 11    Missed Visits: 2    Reporting Period : 2019 to 2019    Date:2019  : 1980  [x]  Patient  Verified  Payor: [employer] / Plan: [address] [place] [address] [address] PT / Product Type: Commerical /    In time:4:02  Out time:4:27  Total Treatment Time (min): 25  Visit #: 5 of 6    Medicare/BCBS Only   Total Timed Codes (min):   1:1 Treatment Time:         SUBJECTIVE  Pain Level (0-10 scale): 0  Any medication changes, allergies to medications, adverse drug reactions, diagnosis change, or new procedure performed?: [x] No    [] Yes (see summary sheet for update)  Subjective functional status/changes:   [] No changes reported  \"Feel good man\"    OBJECTIVE    17 min Therapeutic Exercise:  [] See flow sheet :   Rationale: increase ROM and increase strength to improve the patients ability to perform daily tasks and work duties     8 min Neuromuscular Re-education:  []  See flow sheet :   Rationale: improve coordination and increase proprioception  to improve the patients ability to perform functional tasks with improved cervical and scapular stability         With   [] TE   [] TA   [] neuro   [] other: Patient Education: [x] Review HEP    [] Progressed/Changed HEP based on:   [] positioning [] body mechanics   [] transfers   [] heat/ice application    [] other:      Other Objective/Functional Measures: FOTO score 53% not consistent with pt presentation or subjective reports     Pain Level (0-10 scale) post treatment: 0    Summary of Care:  Updated Goals: to be achieved in 3 weeks:  1. Patient will report FOTO score of 62 or better to indicate significant improvement in functional status. not met 53% 5/31/19  2. Pt will report pain no greater than 1/10 with work tasks to facilitate full return to work duties.   - Pt reports having no pain today, performed full work shift. 5/20/2019; Met- no pain over last 3 sessions 5/31/19  3. Pt will demonstrate neutral cervical and thoracic spine with QP exercises void of cuing to improve postural stability with ADLs.  Met following initial cue for cervical elongation 5/17/19    ASSESSMENT/Changes in Function: Pt has progressed very well with PT with no pain over last 3-4 sessions. He has met his goals in regards to mobility and postural awareness.  Will D/C at this time to HEP management for cervical and scapular stability    Thank you for this referral!      PLAN  [x]Discontinue therapy: [x]Patient has reached or is progressing toward set goals      []Patient is non-compliant or has abdicated      []Due to lack of appreciable progress towards set goals    Janet Obando DPT, HORTENCIAPT 5/31/2019  4:16 PM

## 2019-06-02 ENCOUNTER — HOSPITAL ENCOUNTER (EMERGENCY)
Age: 39
Discharge: HOME | End: 2019-06-02
Attending: EMERGENCY MEDICINE
Payer: COMMERCIAL

## 2019-06-02 ENCOUNTER — APPOINTMENT (OUTPATIENT)
Dept: GENERAL RADIOLOGY | Age: 39
End: 2019-06-02
Attending: PHYSICIAN ASSISTANT
Payer: COMMERCIAL

## 2019-06-02 VITALS
BODY MASS INDEX: 24.16 KG/M2 | SYSTOLIC BLOOD PRESSURE: 119 MMHG | HEIGHT: 65 IN | HEART RATE: 100 BPM | DIASTOLIC BLOOD PRESSURE: 86 MMHG | RESPIRATION RATE: 18 BRPM | OXYGEN SATURATION: 98 % | TEMPERATURE: 98.2 F | WEIGHT: 145 LBS

## 2019-06-02 DIAGNOSIS — M77.8 ELBOW TENDONITIS: ICD-10-CM

## 2019-06-02 DIAGNOSIS — M25.522 LEFT ELBOW PAIN: Primary | ICD-10-CM

## 2019-06-02 PROCEDURE — 73080 X-RAY EXAM OF ELBOW: CPT

## 2019-06-02 PROCEDURE — 99282 EMERGENCY DEPT VISIT SF MDM: CPT

## 2019-06-02 RX ORDER — NAPROXEN 500 MG/1
500 TABLET ORAL 2 TIMES DAILY WITH MEALS
Qty: 20 TAB | Refills: 0 | Status: SHIPPED | OUTPATIENT
Start: 2019-06-02 | End: 2021-08-04

## 2019-06-02 NOTE — DISCHARGE INSTRUCTIONS
Patient Education        Elbow: Exercises  Your Care Instructions  Here are some examples of exercises for elbows. Start each exercise slowly. Ease off the exercise if you start to have pain. Your doctor or physical therapist will tell you when you can start these exercises and which ones will work best for you. How to do the exercises  Wrist flexor stretch    1. Extend your arm in front of you with your palm up. 2. Bend your wrist, pointing your hand toward the floor. 3. With your other hand, gently bend your wrist farther until you feel a mild to moderate stretch in your forearm. 4. Hold for at least 15 to 30 seconds. Repeat 2 to 4 times. Wrist extensor stretch    1. Repeat steps 1 to 4 of the stretch above but begin with your extended hand palm down. Ball or sock squeeze    1. Hold a tennis ball (or a rolled-up sock) in your hand. 2. Make a fist around the ball (or sock) and squeeze. 3. Hold for about 6 seconds, and then relax for up to 10 seconds. 4. Repeat 8 to 12 times. 5. Switch the ball (or sock) to your other hand and do 8 to 12 times. Wrist deviation    1. Sit so that your arm is supported but your hand hangs off the edge of a flat surface, such as a table. 2. Hold your hand out like you are shaking hands with someone. 3. Move your hand up and down. 4. Repeat this motion 8 to 12 times. 5. Switch arms. 6. Try to do this exercise twice with each hand. Wrist curls    1. Place your forearm on a table with your hand hanging over the edge of the table, palm up. 2. Place a 1- to 2-pound weight in your hand. This may be a dumbbell, a can of food, or a filled water bottle. 3. Slowly raise and lower the weight while keeping your forearm on the table and your palm facing up. 4. Repeat this motion 8 to 12 times. 5. Switch arms, and do steps 1 through 4.  6. Repeat with your hand facing down toward the floor. Switch arms. Biceps curls    1.  Sit leaning forward with your legs slightly spread and your left hand on your left thigh. 2. Place your right elbow on your right thigh, and hold the weight with your forearm horizontal.  3. Slowly curl the weight up and toward your chest.  4. Repeat this motion 8 to 12 times. 5. Switch arms, and do steps 1 through 4. Follow-up care is a key part of your treatment and safety. Be sure to make and go to all appointments, and call your doctor if you are having problems. It's also a good idea to know your test results and keep a list of the medicines you take. Where can you learn more? Go to http://janel-crow.info/. Enter M279 in the search box to learn more about \"Elbow: Exercises. \"  Current as of: September 20, 2018  Content Version: 11.9  © 0506-9961 [employer]. Care instructions adapted under license by [employer] (which disclaims liability or warranty for this information). If you have questions about a medical condition or this instruction, always ask your healthcare professional. [address] any warranty or liability for your use of this information. Hello! Messenger Activation    Thank you for requesting access to Hello! Messenger. Please follow the instructions below to securely access and download your online medical record. Hello! Messenger allows you to send messages to your doctor, view your test results, renew your prescriptions, schedule appointments, and more. How Do I Sign Up? 1. In your internet browser, go to [email or web address]  2. Click on the First Time User? Click Here link in the Sign In box. You will be redirect to the New Member Sign Up page. 3. Enter your Hello! Messenger Access Code exactly as it appears below. You will not need to use this code after youve completed the sign-up process. If you do not sign up before the expiration date, you must request a new code.     Hello! Messenger Access Code: [email or web address]  Expires: 6/28/2019  4:20 PM (This is the date your Hello! Messenger access code will )    4. Enter the last four digits of your Social Security Number (xxxx) and Date of Birth (mm/dd/yyyy) as indicated and click Submit. You will be taken to the next sign-up page. 5. Create a TXCOM ID. This will be your TXCOM login ID and cannot be changed, so think of one that is secure and easy to remember. 6. Create a TXCOM password. You can change your password at any time. 7. Enter your Password Reset Question and Answer. This can be used at a later time if you forget your password. 8. Enter your e-mail address. You will receive e-mail notification when new information is available in [address] 9. Click Sign Up. You can now view and download portions of your medical record. 10. Click the Download Summary menu link to download a portable copy of your medical information. Additional Information    If you have questions, please visit the Frequently Asked Questions section of the TXCOM website at [email or web address] Remember, TXCOM is NOT to be used for urgent needs. For medical emergencies, dial 911. Complete all medications as prescribed. Follow-up with primary care doctor in 1 week. Return to the ED immediately for any new or worsening symptoms.

## 2019-06-02 NOTE — ED PROVIDER NOTES
EMERGENCY DEPARTMENT HISTORY AND PHYSICAL EXAM    Date: 6/2/2019  Patient Name: Adonis Hagen    History of Presenting Illness     Chief Complaint   Patient presents with    Elbow Pain         History Provided By:patient    Chief Complaint: elbow pain  Duration: 2 days  Timing:  acute  Location: L elbow  Quality:aching   Severity:moderate  Modifying Factors: worse with movement   Associated Symptoms: swelling      Additional History (Context): Adonis Hagen is a 37 y.o. male with no PMH who presents with c/o atraumatic left elbow pain for the past 2 days. Patient states he does not want of bending and lifting at work. Denies any direct trauma to the elbow. Patient states he feels like the elbow is swollen. Denies treatment prior to arrival, pain is worse with movement. No other complaints at this time. PCP: MAIKEL Oswald    Current Outpatient Medications   Medication Sig Dispense Refill    methocarbamol (ROBAXIN) 750 mg tablet   0    cholecalciferol, VITAMIN D3, (VITAMIN D3) 5,000 unit tab tablet Take  by mouth daily.  diclofenac EC (VOLTAREN) 75 mg EC tablet Take 1 Tab by mouth two (2) times a day. 61 Tab 5       Past History     Past Medical History:  Past Medical History:   Diagnosis Date    Joint pain        Past Surgical History:  Past Surgical History:   Procedure Laterality Date    HX ORTHOPAEDIC      broken neck       Family History:  History reviewed. No pertinent family history. Social History:  Social History     Tobacco Use    Smoking status: Current Every Day Smoker    Smokeless tobacco: Never Used    Tobacco comment: 1 pack last 1 week   Substance Use Topics    Alcohol use: Yes     Comment: occationaly    Drug use: Never       Allergies:  No Known Allergies      Review of Systems   Review of Systems   Constitutional: Negative. Negative for chills and fever. HENT: Negative. Negative for congestion, ear pain and rhinorrhea. Eyes: Negative.   Negative for pain and redness. Respiratory: Negative. Negative for cough, shortness of breath, wheezing and stridor. Cardiovascular: Negative. Negative for chest pain and leg swelling. Gastrointestinal: Negative. Negative for abdominal pain, constipation, diarrhea, nausea and vomiting. Genitourinary: Negative. Negative for dysuria and frequency. Musculoskeletal: Positive for arthralgias and joint swelling. Negative for back pain and neck pain. Skin: Negative. Negative for rash and wound. Neurological: Negative. Negative for dizziness, seizures, syncope and headaches. All other systems reviewed and are negative. All Other Systems Negative  Physical Exam     Vitals:    06/02/19 0959   BP: 119/86   Pulse: 100   Resp: 18   Temp: 98.2 °F (36.8 °C)   SpO2: 98%   Weight: 65.8 kg (145 lb)   Height: 5' 5\" (1.651 m)     Physical Exam   Constitutional: He is oriented to person, place, and time. He appears well-developed and well-nourished. No distress. HENT:   Head: Normocephalic and atraumatic. Eyes: Conjunctivae are normal. Right eye exhibits no discharge. Left eye exhibits no discharge. No scleral icterus. Neck: Normal range of motion. Neck supple. Cardiovascular: Normal rate, regular rhythm and normal heart sounds. Exam reveals no gallop and no friction rub. No murmur heard. Pulmonary/Chest: Effort normal and breath sounds normal. No stridor. No respiratory distress. He has no wheezes. He has no rales. Musculoskeletal: Normal range of motion. He exhibits tenderness. He exhibits no edema or deformity. LUE: intact pulses, no deformity noted, TTP noted diffusely over the olecranon, no edema seen. ROM of the LUE intact. Neurological: He is alert and oriented to person, place, and time. Coordination normal.   Gait is steady. Able to ambulate without difficulty. Skin: Skin is warm and dry. No rash noted. He is not diaphoretic. No erythema. Psychiatric: He has a normal mood and affect.  His behavior is normal. Thought content normal.   Nursing note and vitals reviewed. Diagnostic Study Results     Labs -   No results found for this or any previous visit (from the past 12 hour(s)). Radiologic Studies -   XR ELBOW LT MIN 3 V    (Results Pending)   no acute process  CT Results  (Last 48 hours)    None        CXR Results  (Last 48 hours)    None            Medical Decision Making   I am the first provider for this patient. I reviewed the vital signs, available nursing notes, past medical history, past surgical history, family history and social history. Vital Signs-Reviewed the patient's vital signs. Records Reviewed: Holly Kay PA-C     Procedures:  Procedures    Provider Notes (Medical Decision Making): Impression:  Elbow tendonitis    X-rays negative for acute process, explained to the pt that his sx are likely secondary to elbow tendonitis. Will plan to d/c with symptomatic tx with NSAIDs and recommend PCP follow-up. Pt agrees. Holly Kay PA-C     MED RECONCILIATION:  No current facility-administered medications for this encounter. Current Outpatient Medications   Medication Sig    methocarbamol (ROBAXIN) 750 mg tablet     cholecalciferol, VITAMIN D3, (VITAMIN D3) 5,000 unit tab tablet Take  by mouth daily.  diclofenac EC (VOLTAREN) 75 mg EC tablet Take 1 Tab by mouth two (2) times a day. Disposition:  D/c    DISCHARGE NOTE:   Patient is stable for discharge at this time. Rx for naproxen given. Rest and follow-up with PCP this week. Return to the ED immediately for any new or worsening sx. Holly Morel PA-C 10:30 AM   Follow-up Information     Follow up With Specialties Details Why Contact Info    Jordan Evangelistama Physician Assistant Schedule an appointment as soon as possible for a visit in 1 week  [address]      DAY Fel[place].    As needed [address] [place] [address], [address] [facility] EMERGENCY DEPT Emergency Medicine  As needed, If symptoms worsen [address] [id number] [phone]              Diagnosis     Clinical Impression:   1.  Left elbow pain    2. Elbow tendonitis

## 2021-03-09 ENCOUNTER — HOSPITAL ENCOUNTER (EMERGENCY)
Age: 41
Discharge: HOME | End: 2021-03-09
Attending: EMERGENCY MEDICINE
Payer: COMMERCIAL

## 2021-03-09 VITALS
HEART RATE: 78 BPM | OXYGEN SATURATION: 100 % | SYSTOLIC BLOOD PRESSURE: 122 MMHG | BODY MASS INDEX: 24.13 KG/M2 | WEIGHT: 145 LBS | DIASTOLIC BLOOD PRESSURE: 94 MMHG | RESPIRATION RATE: 16 BRPM | TEMPERATURE: 98.1 F

## 2021-03-09 DIAGNOSIS — G54.0 THORACIC OUTLET SYNDROME: Primary | ICD-10-CM

## 2021-03-09 PROCEDURE — 99281 EMR DPT VST MAYX REQ PHY/QHP: CPT

## 2021-03-09 RX ORDER — GABAPENTIN 300 MG/1
300 CAPSULE ORAL 3 TIMES DAILY
Qty: 90 CAP | Refills: 0 | Status: SHIPPED | OUTPATIENT
Start: 2021-03-09 | End: 2021-08-04

## 2021-03-09 NOTE — LETTER
NOTIFICATION RETURN TO WORK / SCHOOL 
 
3/9/2021 7:02 AM 
 
Mr. Drake Landaverde [id number] To Whom It May Concern: 
 
Drake Medina is currently under the care of [facility] EM[facility]GENCY [facility]. He will return to work/school on: 3/10/2021 Drake Medina may return to work/school with the following restrictions: Limited use of left arm. No overhead lifting. If there are questions or concerns please have the patient contact our office. Sincerely, Tj Olivo MD

## 2021-03-09 NOTE — ED TRIAGE NOTES
Patient states he is having pa in in left neck, shoulder area. States his arm goes numb at times. Pt states he saw his doctor about 6 days ago for same. Was given cyclopbenzaprine.

## 2021-03-13 ENCOUNTER — HOSPITAL ENCOUNTER (EMERGENCY)
Age: 41
Discharge: HOME | End: 2021-03-13
Attending: EMERGENCY MEDICINE
Payer: COMMERCIAL

## 2021-03-13 VITALS
RESPIRATION RATE: 17 BRPM | OXYGEN SATURATION: 100 % | DIASTOLIC BLOOD PRESSURE: 76 MMHG | SYSTOLIC BLOOD PRESSURE: 124 MMHG | HEART RATE: 98 BPM | TEMPERATURE: 98 F

## 2021-03-13 DIAGNOSIS — M77.9 TENDONITIS: Primary | ICD-10-CM

## 2021-03-13 PROCEDURE — 99283 EMERGENCY DEPT VISIT LOW MDM: CPT

## 2021-03-13 RX ORDER — NAPROXEN 250 MG/1
375 TABLET ORAL ONCE
Status: DISCONTINUED | OUTPATIENT
Start: 2021-03-13 | End: 2021-03-13

## 2021-03-13 RX ORDER — IBUPROFEN 600 MG/1
600 TABLET ORAL
Qty: 20 TAB | Refills: 0 | Status: SHIPPED | OUTPATIENT
Start: 2021-03-13 | End: 2021-08-04

## 2021-03-13 NOTE — ED TRIAGE NOTES
Pt arrived with complaint of left arm pain x 2 days. Reports similar symptoms on Mar 9 2021. States \"feels like I hit my funny bone but it wont stop\".

## 2021-03-13 NOTE — ED NOTES
Pt assessment upon discharge was stable  Pt discharged home  Education was completed   Verbal instruction was given   Pt understood and read back   Pt discharged with self  Pt received paperwork about discharge instructions   Reviewed Rxs and explained usage and side effects in detail   Released care of pt

## 2021-03-13 NOTE — ED NOTES
Assumed care of pt   Introduced self   Updated the white board   Explained usage  Pt received from triage ambulatory   Pt c/o elbow pain   Denied injury or fall   Pt stated feels like they hit there elbow   Breathing equal and unlabored   Lungs sounds clear and present in all feilds  Rated pain 6/10 and described as achy  Updated about plan of care   Will continue to monitor and assess

## 2021-03-13 NOTE — ED PROVIDER NOTES
HPI     Pt is a 36 y/o M who presents for 2 days of L elbow pain. Sensation is a dull pain at rest that gets sharp w/ agitation. has accompanying numbness. No inciting events or trauma. Syndrome located over lateral radial head. Pt has been seen recently for L thoracic outlet syndrome and given rx for gabapentin, flexiril, and ortho f/u. Pt states that the meds do not make his elbow pain better but all other issues have resolved. Past Medical History:   Diagnosis Date    Joint pain        Past Surgical History:   Procedure Laterality Date    HX ORTHOPAEDIC      broken neck         No family history on file.     Social History     Socioeconomic History    Marital status:      Spouse name: Not on file    Number of children: Not on file    Years of education: Not on file    Highest education level: Not on file   Occupational History    Not on file   Social Needs    Financial resource strain: Not on file    Food insecurity     Worry: Not on file     Inability: Not on file    Transportation needs     Medical: Not on file     Non-medical: Not on file   Tobacco Use    Smoking status: Current Every Day Smoker    Smokeless tobacco: Never Used    Tobacco comment: 1 pack last 1 week   Substance and Sexual Activity    Alcohol use: Yes     Comment: occationaly    Drug use: Never    Sexual activity: Not on file   Lifestyle    Physical activity     Days per week: Not on file     Minutes per session: Not on file    Stress: Not on file   Relationships    Social connections     Talks on phone: Not on file     Gets together: Not on file     Attends Baptism service: Not on file     Active member of club or organization: Not on file     Attends meetings of clubs or organizations: Not on file     Relationship status: Not on file    Intimate partner violence     Fear of current or ex partner: Not on file     Emotionally abused: Not on file     Physically abused: Not on file     Forced sexual activity: Not on file   Other Topics Concern    Not on file   Social History Narrative    Not on file         ALLERGIES: Patient has no known allergies. Review of Systems   Constitutional: Negative for activity change, appetite change, chills, diaphoresis, fatigue, fever and unexpected weight change. HENT: Negative for congestion, dental problem, ear discharge, ear pain, facial swelling, hearing loss, mouth sores, nosebleeds, postnasal drip, rhinorrhea and sinus pain. Eyes: Negative for pain, discharge, redness and itching. Respiratory: Negative for apnea, cough, choking, chest tightness, shortness of breath, wheezing and stridor. Cardiovascular: Negative for chest pain, palpitations and leg swelling. Gastrointestinal: Negative for abdominal distention, abdominal pain, anal bleeding, blood in stool, constipation, diarrhea, nausea, rectal pain and vomiting. Musculoskeletal: Positive for arthralgias and joint swelling. Negative for back pain, gait problem, myalgias, neck pain and neck stiffness. Skin: Negative for color change, pallor and rash. Neurological: Negative for dizziness, facial asymmetry, light-headedness and headaches. Psychiatric/Behavioral: Negative for agitation, behavioral problems, confusion and decreased concentration. Vitals:    03/13/21 0612   BP: (!) 118/104   Pulse: (!) 106   Resp: 16   Temp: 97.5 °F (36.4 °C)   SpO2: 100%            Physical Exam  Constitutional:       Appearance: Normal appearance. He is normal weight. HENT:      Head: Normocephalic and atraumatic. Nose: Nose normal.      Mouth/Throat:      Mouth: Mucous membranes are moist.      Pharynx: Oropharynx is clear. Eyes:      Conjunctiva/sclera: Conjunctivae normal.      Pupils: Pupils are equal, round, and reactive to light. Neck:      Musculoskeletal: Normal range of motion. Cardiovascular:      Rate and Rhythm: Normal rate and regular rhythm. Pulses: Normal pulses.       Heart sounds: Normal heart sounds. Pulmonary:      Effort: Pulmonary effort is normal.      Breath sounds: Normal breath sounds. Abdominal:      General: Abdomen is flat. Bowel sounds are normal.      Palpations: Abdomen is soft. Musculoskeletal: Normal range of motion. Comments: Tenderness over L lateral elbow. Strength on L 2/5   Skin:     General: Skin is warm and dry. Capillary Refill: Capillary refill takes less than 2 seconds. Neurological:      General: No focal deficit present. Mental Status: He is alert and oriented to person, place, and time. Psychiatric:         Mood and Affect: Mood normal.         Behavior: Behavior normal.          MDM  Number of Diagnoses or Management Options  Tendonitis: minor  Diagnosis management comments: 0622: Seen in pt room, pt states his L elbow has pain and goes numb. Pt endorses taking cyclobenzaprine and gabapentin for similar sx recently, but these meds have no effect. L lateral elbow (over radial head) is numb and tender to papation. LUE from elbow nazario is 2/5 strength. Pulses intact. No obvious deformity, but minimal swelling in area appreciated. Does not know who his Ortho f/u is with.     6865: Wrap arm in ACE bandage.  Will d/c w/ ibuprofen and have pt f/u w/ Orthopedics previously assigned    Risk of Complications, Morbidity, and/or Mortality  Presenting problems: minimal  Diagnostic procedures: minimal  Management options: minimal    Patient Progress  Patient progress: stable       Discharged     Vimal Cross DO  PGY-2, [address]

## 2021-03-16 ENCOUNTER — HOSPITAL ENCOUNTER (OUTPATIENT)
Dept: PHYSICAL THERAPY | Age: 41
Discharge: HOME | End: 2021-03-16
Payer: COMMERCIAL

## 2021-03-16 ENCOUNTER — HOSPITAL ENCOUNTER (OUTPATIENT)
Dept: GENERAL RADIOLOGY | Age: 41
Discharge: HOME | End: 2021-03-16
Payer: COMMERCIAL

## 2021-03-16 ENCOUNTER — TRANSCRIBE ORDER (OUTPATIENT)
Dept: REGISTRATION | Age: 41
End: 2021-03-16

## 2021-03-16 DIAGNOSIS — M25.512 LEFT SHOULDER PAIN, UNSPECIFIED CHRONICITY: Primary | ICD-10-CM

## 2021-03-16 DIAGNOSIS — M54.2 CERVICALGIA: ICD-10-CM

## 2021-03-16 DIAGNOSIS — M25.512 LEFT SHOULDER PAIN, UNSPECIFIED CHRONICITY: ICD-10-CM

## 2021-03-16 PROCEDURE — 97110 THERAPEUTIC EXERCISES: CPT

## 2021-03-16 PROCEDURE — 97162 PT EVAL MOD COMPLEX 30 MIN: CPT

## 2021-03-16 PROCEDURE — 72040 X-RAY EXAM NECK SPINE 2-3 VW: CPT

## 2021-03-16 PROCEDURE — 73030 X-RAY EXAM OF SHOULDER: CPT

## 2021-03-16 NOTE — PROGRESS NOTES
[facility]  [address]  [place], [address]  [phone] fax    Plan of Care/ Statement of Necessity for Physical Therapy Services    Patient name: Claudia Dumas Start of Care: 3/16/2021   Referral source: Lionel Ferreira MD : 1980    Medical Diagnosis: Thoracic outlet syndrome [G54.0]  Payor: Lavon Clark / Plan: [place] / Product Type: PPO /  Onset OCQJ:3-6-42    Treatment Diagnosis: left UE numbness/pain    Prior Hospitalization: see medical history Provider#: [id number]   Medications: Verified on Patient summary List    Comorbidities: c/s pain, C2 fracture with surgery   Prior Level of Function: functionally I with daily activities, works as a  at the [employer]  and following information is based on the information from the initial evaluation. Assessment/ key information: 34 y/o male presents with c/o left UE numbness and pain radiating down into the left hand 4th and 5th digits. Decreased left  strength is noted as compared to the right. - Adson's test, + c/s compression and Spurlings to the left. - Tinel's sign of the left cubital tunnel. + active trigger points noted in the left UT and infraspinatus and scalenes. C/s AROM is limited with flexion, extension left SB and left rotation. The pt's UE symptoms seem to be related to the c/s. The patient will benefit from PT to address the aforementioned impairments. He was instructed to also contact his spine specialist for f/u. Evaluation Complexity History MEDIUM  Complexity : 1-2 comorbidities / personal factors will impact the outcome/ POC ; Examination MEDIUM Complexity : 3 Standardized tests and measures addressing body structure, function, activity limitation and / or participation in recreation  ;Presentation MEDIUM Complexity : Evolving with changing characteristics  ; Clinical Decision Making MEDIUM Complexity : FOTO score of 26-74  Overall Complexity Rating: MEDIUM  Problem List: pain affecting function, decrease ROM, decrease strength, decrease ADL/ functional abilitiies, decrease activity tolerance and decrease flexibility/ joint mobility   Treatment Plan may include any combination of the following: Therapeutic exercise, Therapeutic activities, Neuromuscular re-education, Physical agent/modality, Manual therapy, Patient education and Self Care training  Patient / Family readiness to learn indicated by: asking questions, trying to perform skills and interest  Persons(s) to be included in education: patient (P)  Barriers to Learning/Limitations: None  Patient Goal (s): To have no pain  Patient Self Reported Health Status: good  Rehabilitation Potential: good    Short Term Goals: To be accomplished in 1 weeks:   1. The pt will be I and compliant with HEP     Long Term Goals: To be accomplished in 4 weeks:   1. Improve FOTO score to predicted outcome to improve ability for daily tasks. 2. The pt will report at least 50% reduction in left UE symptoms to improve ability for daily tasks   3. The pt will demonstrate at least 80# of left  strength to improve ability for ADL   4. The pt will demonstrate 55 degrees of left c/s rotation without pain for improved ability for ADL     Frequency / Duration: Patient to be seen 2 times per week for 4 weeks. Patient/ Caregiver education and instruction: Diagnosis, prognosis, self care, activity modification and exercises   [x]  Plan of care has been reviewed with OBED Urrutia, PT 3/16/2021 12:47 PM    ________________________________________________________________________    I certify that the above Therapy Services are being furnished while the patient is under my care. I agree with the treatment plan and certify that this therapy is necessary.     Physician's Signature:____________Date:_________TIME:________     Lizett Calderon MD  ** Signature, Date and Time must be completed for valid certification **    Please sign and return to [facility] [address] [facility] [address]  [address]  [place], [address]  [phone] fax

## 2021-03-16 NOTE — PROGRESS NOTES
PT DAILY TREATMENT NOTE 11    Patient Name: Barber Moore  Date:3/16/2021  : 1980  [x]  Patient  Verified  Payor: Inga Lipoma / Plan: [place] [employer] / Product Type: PPO /    In time:1:00  Out time:1:43  Total Treatment Time (min): 43  Visit #: 1 of 8    Medicare/BCBS Only   Total Timed Codes (min):  23 1:1 Treatment Time:  20       Treatment Area: Thoracic outlet syndrome [G54.0]    SUBJECTIVE  Pain Level (0-10 scale): 8   Any medication changes, allergies to medications, adverse drug reactions, diagnosis change, or new procedure performed?: [x] No    [] Yes (see summary sheet for update)  Subjective functional status/changes:   [] No changes reported       OBJECTIVE    Modality rationale:    Min Type Additional Details    [] Estim:  []Unatt       []IFC  []Premod                        []Other:  []w/ice   []w/heat  Position:  Location:    [] Estim: []Att    []TENS instruct  []NMES                    []Other:  []w/US   []w/ice   []w/heat  Position:  Location:    []  Traction: [] Cervical       []Lumbar                       [] Prone          []Supine                       []Intermittent   []Continuous Lbs:  [] before manual  [] after manual    []  Ultrasound: []Continuous   [] Pulsed                           []1MHz   []3MHz W/cm2:  Location:    []  Iontophoresis with dexamethasone         Location: [] Take home patch   [] In clinic    []  Ice     []  heat  []  Ice massage  []  Laser   []  Anodyne Position:  Location:    []  Laser with stim  []  Other:  Position:  Location:    []  Vasopneumatic Device Pressure:       [] lo [] med [] hi   Temperature: [] lo [] med [] hi   [] Skin assessment post-treatment:  []intact []redness- no adverse reaction    []redness  adverse reaction:     23 min [x]Eval                  []Re-Eval       20 min Therapeutic Exercise:  [] See flow sheet : HEP   Rationale: increase ROM to improve the patients ability to perform ADL            With   [] TE   [] TA   [] neuro   [] other: Patient Education: [x] Review HEP    [] Progressed/Changed HEP based on:   [] positioning   [] body mechanics   [] transfers   [] heat/ice application    [] other:      Other Objective/Functional Measures:       Pain Level (0-10 scale) post treatment: 8    ASSESSMENT/Changes in Function:      Patient will continue to benefit from skilled PT services to modify and progress therapeutic interventions, address functional mobility deficits, address ROM deficits, address strength deficits, analyze and address soft tissue restrictions, analyze and cue movement patterns and assess and modify postural abnormalities to attain remaining goals. [x]  See Plan of Care  []  See progress note/recertification  []  See Discharge Summary         Progress towards goals / Updated goals:  Short Term Goals: To be accomplished in 1 weeks:   1. The pt will be I and compliant with HEP   IE- issued HEP  Long Term Goals: To be accomplished in 4 weeks:   1. Improve FOTO score to predicted outcome to improve ability for daily tasks. IE- 40   2. The pt will report at least 50% reduction in left UE symptoms to improve ability for daily tasks   IE- constant numbness and pain   3. The pt will demonstrate at least 80# of left  strength to improve ability for ADL   IE- 35# avg of 2 trials   4.  The pt will demonstrate 55 degrees of left c/s rotation without pain for improved ability for ADL   IE- 45 degrees    PLAN  []  Upgrade activities as tolerated     [x]  Continue plan of care  []  Update interventions per flow sheet       []  Discharge due to:_  []  Other:_      Brock Gill PT 3/16/2021  12:40 PM    Future Appointments   Date Time Provider Roc Zimmermani   3/16/2021  1:00 PM Nimisha Fox PT [id number]

## 2021-03-24 ENCOUNTER — HOSPITAL ENCOUNTER (OUTPATIENT)
Dept: PHYSICAL THERAPY | Age: 41
Discharge: HOME | End: 2021-03-24
Payer: COMMERCIAL

## 2021-03-24 PROCEDURE — 97140 MANUAL THERAPY 1/> REGIONS: CPT

## 2021-03-24 PROCEDURE — 97032 APPL MODALITY 1+ESTIM EA 15: CPT

## 2021-03-24 PROCEDURE — 97110 THERAPEUTIC EXERCISES: CPT

## 2021-03-24 NOTE — PROGRESS NOTES
PT DAILY TREATMENT NOTE 10-18    Patient Name: Symone Reason  Date:3/24/2021  : 1980  [x]  Patient  Verified  Payor: Paresh Bartlett / Plan: [place] [employer] [place] / Product Type: PPO /    In time:300  Out time:340  Total Treatment Time (min): 40  Visit #: 2 of 8      Treatment Area: Thoracic outlet syndrome [G54.0]    SUBJECTIVE  Pain Level (0-10 scale): 6  Any medication changes, allergies to medications, adverse drug reactions, diagnosis change, or new procedure performed?: [x] No    [] Yes (see summary sheet for update)  Subjective functional status/changes:   [] No changes reported  No change    OBJECTIVE    Modality rationale: decrease pain and increase tissue extensibility to improve the patients ability to decrease numbness   Min Type Additional Details    [] Estim:  []Unatt       []IFC  []Premod                        []Other:  []w/ice   []w/heat  Position:  Location:   8 [x] Estim: [x]Att    []TENS instruct  []NMES                    []Other:  [x]w/US   []w/ice   []w/heat  Position:supine  Location:left triceps medial    []  Traction: [] Cervical       []Lumbar                       [] Prone          []Supine                       []Intermittent   []Continuous Lbs:  [] before manual  [] after manual    []  Ultrasound: []Continuous   [] Pulsed                           []1MHz   []3MHz W/cm2:  Location:    []  Iontophoresis with dexamethasone         Location: [] Take home patch   [] In clinic    []  Ice     []  heat  []  Ice massage  []  Laser   []  Anodyne Position:  Location:    []  Laser with stim  []  Other:  Position:  Location:    []  Vasopneumatic Device Pressure:       [] lo [] med [] hi   Temperature: [] lo [] med [] hi   [] Skin assessment post-treatment:  []intact []redness- no adverse reaction    []redness  adverse reaction:       8 min Therapeutic Exercise:  [] See flow sheet :   Rationale: increase ROM and increase strength to improve the patients ability to perform work tasks         24 min Manual Therapy:  STM left lev, UT, infra, scalenes, triceps; grade 2-3 lower C/S PAs   The manual therapy interventions were performed at a separate and distinct time from the therapeutic activities interventions. Rationale: decrease pain, increase ROM, increase tissue extensibility and decreased numbness to perform daily activities   With   [] TE   [] TA   [] neuro   [] other: Patient Education: [x] Review HEP    [] Progressed/Changed HEP based on:   [] positioning   [] body mechanics   [] transfers   [] heat/ice application    [] other:      Other Objective/Functional Measures:      Pain Level (0-10 scale) post treatment: 1    ASSESSMENT/Changes in Function: Unremitting tingling into ulnar distribution, despite manual, therex, and combo. Will reevaluate C8 distribution (thenar eminence) strength next visit to further determine if sx are stemming from C/S or cubital fossa. Patient will continue to benefit from skilled PT services to modify and progress therapeutic interventions, address strength deficits, analyze and address soft tissue restrictions, analyze and cue movement patterns, assess and modify postural abnormalities and decrease numbness to attain remaining goals. []  See Plan of Care  []  See progress note/recertification  []  See Discharge Summary         Progress towards goals / Updated goals:  Short Term Goals: To be accomplished in 1 weeks:              1. The pt will be I and compliant with HEP              IE- issued HEP   Current: goal met 3/24/21  Long Term Goals: To be accomplished in 4 weeks:              1. Improve FOTO score to predicted outcome to improve ability for daily tasks.                IE- 40              2. The pt will report at least 50% reduction in left UE symptoms to improve ability for daily tasks              IE- constant numbness and pain              3. The pt will demonstrate at least 80# of left  strength to improve ability for ADL              IE- 35# avg of 2 trials              4. The pt will demonstrate 55 degrees of left c/s rotation without pain for improved ability for ADL              IE- 45 degrees    PLAN  []  Upgrade activities as tolerated     []  Continue plan of care  []  Update interventions per flow sheet       []  Discharge due to:_  []  Other:_      Dustin Gusman, MPT, CMTPT 3/24/2021  12:14 PM    Future Appointments   Date Time Provider Roc Gonsales   3/24/2021  3:00 PM Melanie Gallegos B, PT [facility]   3/26/2021  3:00 PM Carlton Gross, PTA [facility]   3/29/2021  2:30 PM Leandrew Shala [facility]   3/31/2021  3:00 PM Leandrew Shala [facility]   4/5/2021  2:30 PM Flecathy Lapidus [facility]   4/7/2021  3:00 PM Leandrew Shala [facility]   4/12/2021  3:00 PM Shania Sandhu, PTA [facility]   4/14/2021  2:30 PM Mica Hence [facility]

## 2021-03-26 ENCOUNTER — HOSPITAL ENCOUNTER (OUTPATIENT)
Dept: PHYSICAL THERAPY | Age: 41
Discharge: HOME | End: 2021-03-26
Payer: COMMERCIAL

## 2021-03-26 PROCEDURE — 97140 MANUAL THERAPY 1/> REGIONS: CPT

## 2021-03-26 PROCEDURE — 97110 THERAPEUTIC EXERCISES: CPT

## 2021-03-26 PROCEDURE — 97032 APPL MODALITY 1+ESTIM EA 15: CPT

## 2021-03-26 NOTE — PROGRESS NOTES
PT DAILY TREATMENT NOTE     Patient Name: Cyndi Parker  Date:3/26/2021  : 1980  [x]  Patient  Verified  Payor: Camilo Burrell / [place]: [place] [employer] [place] / Product Type: PPO /    In time:214  Out time:254  Total Treatment Time (min): 40  Visit #: 3 of 8    Medicare/BCBS Only   Total Timed Codes (min):   1:1 Treatment Time:         Treatment Area: Thoracic outlet syndrome [G54.0]    SUBJECTIVE  Pain Level (0-10 scale): 5  Any medication changes, allergies to medications, adverse drug reactions, diagnosis change, or new procedure performed?: [x] No    [] Yes (see summary sheet for update)  Subjective functional status/changes:   [] No changes reported  Patient reports no change in sxs and pain since last visit. \"tingling\" pain reported in left 4th and 5th digit as well as medial elbow.      OBJECTIVE    Modality rationale: decrease pain and increase tissue extensibility to improve the patients ability to perform ADLs   Min Type Additional Details    [] Estim:  []Unatt       []IFC  []Premod                        []Other:  []w/ice   []w/heat  Position:  Location:   8 [x] Estim: [x]Att    []TENS instruct  []NMES                    [x]Other: combo  [x]w/US   []w/ice   []w/heat  Position:prone  Location: left t/s paraspinals and medial boarder of left scapula    []  Traction: [] Cervical       []Lumbar                       [] Prone          []Supine                       []Intermittent   []Continuous Lbs:  [] before manual  [] after manual    []  Ultrasound: []Continuous   [] Pulsed                           []1MHz   []3MHz W/cm2:  Location:    []  Iontophoresis with dexamethasone         Location: [] Take home patch   [] In clinic    []  Ice     []  heat  []  Ice massage  []  Laser   []  Anodyne Position:  Location:    []  Laser with stim  []  Other:  Position:  Location:    []  Vasopneumatic Device Pressure:       [] lo [] med [] hi   Temperature: [] lo [] med [] hi   [] Skin assessment post-treatment:  []intact []redness- no adverse reaction    []redness  adverse reaction:         8 min Therapeutic Exercise:  [x] See flow sheet :   Rationale: increase ROM and increase strength to improve the patients ability to perform ADLs      24 min Manual Therapy:  STM left lev, UT, infra, scalenes, T/S paraspinals. grade 2-3 lower C/S, and T1/T10 T/S PAs   The manual therapy interventions were performed at a separate and distinct time from the therapeutic activities interventions. Rationale: increase ROM and increase tissue extensibility to perform ADLs              With   [] TE   [] TA   [] neuro   [] other: Patient Education: [x] Review HEP    [] Progressed/Changed HEP based on:   [] positioning   [] body mechanics   [] transfers   [] heat/ice application    [] other:      Other Objective/Functional Measures:   Significant weakness (3/5 MMT) thenar eminence on left. Reproduced sxs in elbow with T/S PAs and TPR on left but no change in sxs with any C/S manual      Pain Level (0-10 scale) post treatment: 6    ASSESSMENT/Changes in Function: Patient presented with significant weakness (3/5 MMT) thenar eminence on left, but sxs only reproduced with T/S manual today. Patient continues to demonstrate overall decreased spinal mobility and had minimal improvement with PAs and mobilization today. Patient will continue to benefit from skilled PT services to modify and progress therapeutic interventions, address functional mobility deficits, address ROM deficits, address strength deficits, analyze and address soft tissue restrictions and analyze and cue movement patterns to attain remaining goals. [x]  See Plan of Care  []  See progress note/recertification  []  See Discharge Summary         Progress towards goals / Updated goals:  Short Term Goals: To be accomplished in 1 weeks:              6.  The pt will be I and compliant with HEP              IE- issued HEP              Current: goal met 3/24/21  Long Term Goals: To be accomplished in 4 weeks:              1. Improve FOTO score to predicted outcome to improve ability for daily tasks.               IE- 40              2. The pt will report at least 50% reduction in left UE symptoms to improve ability for daily tasks              IE- constant numbness and pain              3. The pt will demonstrate at least 80# of left  strength to improve ability for ADL              IE- 35# avg of 2 trials              4.  The pt will demonstrate 55 degrees of left c/s rotation without pain for improved ability for ADL              IE- 45 degrees    PLAN  []  Upgrade activities as tolerated     [x]  Continue plan of care  []  Update interventions per flow sheet       []  Discharge due to:_  []  Other:_      Elizabeth Lake PTA 3/26/2021  2:54 PM    Future Appointments   Date Time Provider Roc Gonsales   3/29/2021  2:30 PM Celesta Pottier [facility]   3/31/2021  3:00 PM Celesta Pottier [facility]   4/5/2021  2:30 PM Celesta Pottier [facility]   4/7/2021  3:00 PM Celesta Pottier [facility]   4/12/2021  3:00 PM Marcelle Alvarez PTA [facility]   4/14/2021  2:30 PM Ricardo Ghotra [facility]

## 2021-03-29 ENCOUNTER — HOSPITAL ENCOUNTER (OUTPATIENT)
Dept: PHYSICAL THERAPY | Age: 41
Discharge: HOME | End: 2021-03-29
Payer: COMMERCIAL

## 2021-03-29 PROCEDURE — 97110 THERAPEUTIC EXERCISES: CPT

## 2021-03-29 PROCEDURE — 97032 APPL MODALITY 1+ESTIM EA 15: CPT

## 2021-03-29 NOTE — PROGRESS NOTES
PT DAILY TREATMENT NOTE 11    Patient Name: Basia Flores  Date:3/29/2021  : 1980  [x]  Patient  Verified  Payor: Jeanne Hayes / Plan: [place] [employer] [place] / Product Type: PPO /    In time: 230  Out time:314  Total Treatment Time (min): 44  Visit #: 4 of 8    Treatment Area: Thoracic outlet syndrome [G54.0]    SUBJECTIVE  Pain Level (0-10 scale): 6  Any medication changes, allergies to medications, adverse drug reactions, diagnosis change, or new procedure performed?: [x] No    [] Yes (see summary sheet for update)  Subjective functional status/changes:   [] No changes reported  Patient reports no changes and continues to have tingling into arm.      OBJECTIVE    Modality rationale: decrease pain, increase tissue extensibility and increase muscle contraction/control to improve the patients ability to perform daily tasks and improve sleep tolerance with greater ease    Min Type Additional Details    [] Estim:  []Unatt       []IFC  []Premod                        []Other:  []w/ice   []w/heat  Position:  Location:   8' + 2' setup [x] Estim: [x]Att    []TENS instruct  []NMES                    []Other:  [x]w/US   []w/ice   []w/heat  Position: supine   Location: left medial triceps     []  Traction: [] Cervical       []Lumbar                       [] Prone          []Supine                       []Intermittent   []Continuous Lbs:  [] before manual  [] after manual    []  Ultrasound: []Continuous   [] Pulsed                           []1MHz   []3MHz W/cm2:  Location:    []  Iontophoresis with dexamethasone         Location: [] Take home patch   [] In clinic    []  Ice     []  heat  []  Ice massage  []  Laser   []  Anodyne Position:  Location:    []  Laser with stim  []  Other:  Position:  Location:    []  Vasopneumatic Device Pressure:       [] lo [] med [] hi   Temperature: [] lo [] med [] hi   [] Skin assessment post-treatment:  []intact []redness- no adverse reaction    []redness  adverse reaction:     34 min Therapeutic Exercise:  [x] See flow sheet: Education provided on importance of avoiding motions/activities that increase numbness/tingling. Rationale: increase ROM, increase strength and improve coordination to improve the patients ability to perform ADLs and self care tasks with greater ease        With   [] TE   [] TA   [] neuro   [] other: Patient Education: [x] Review HEP    [] Progressed/Changed HEP based on:   [] positioning   [] body mechanics   [] transfers   [] heat/ice application    [] other:       Pain Level (0-10 scale) post treatment: 4    ASSESSMENT/Changes in Function: Patient with increased tingling through left UE with 1/2 prone horizontal abduction (T's) and W's for scapular stabilization. Less symptoms with shoulder extension with scapular squeeze. Patient with less pain reported following combo stimulation/US to left medial triceps. Held manual today to minimize discomfort and radicular symptoms as he reports last time it flared his symptoms more. Patient will continue to benefit from skilled PT services to modify and progress therapeutic interventions, address functional mobility deficits, address ROM deficits, address strength deficits, analyze and address soft tissue restrictions, analyze and cue movement patterns, analyze and modify body mechanics/ergonomics, assess and modify postural abnormalities, address imbalance/dizziness and instruct in home and community integration to attain remaining goals. [x]  See Plan of Care  []  See progress note/recertification  []  See Discharge Summary         Progress towards goals / Updated goals:  Short Term Goals: To be accomplished in 1 weeks:              8. The pt will be I and compliant with HEP              IE- issued HEP              BRDCPMD: goal met 3/24/21    Long Term Goals: To be accomplished in 4 weeks:              1. Improve FOTO score to predicted outcome to improve ability for daily tasks.               IE- 40              2. The pt will report at least 50% reduction in left UE symptoms to improve ability for daily tasks              IE- constant numbness and pain              3. The pt will demonstrate at least 80# of left  strength to improve ability for ADL              IE- 35# avg of 2 trials              4.  The pt will demonstrate 55 degrees of left c/s rotation without pain for improved ability for ADL              IE- 45 degrees    PLAN  [x]  Upgrade activities as tolerated     []  Continue plan of care  []  Update interventions per flow sheet       []  Discharge due to:_  []  Other:_      Jonathan Morel, PT, DPT 3/29/2021  2:39 PM    Future Appointments   Date Time Provider Roc Gonsales   3/31/2021  3:00 PM Katalina Cueto [facility]   4/5/2021  2:30 PM Katalina Cueto [facility]   4/7/2021  3:00 PM Katalina Cueto [facility]   4/12/2021  3:00 PM Elisa Barcenas PTA [facility]   4/14/2021  2:30 PM Army Ogdoy [facility]

## 2021-04-05 ENCOUNTER — HOSPITAL ENCOUNTER (OUTPATIENT)
Dept: PHYSICAL THERAPY | Age: 41
Discharge: HOME | End: 2021-04-05
Payer: COMMERCIAL

## 2021-04-05 PROCEDURE — 97012 MECHANICAL TRACTION THERAPY: CPT

## 2021-04-05 PROCEDURE — 97110 THERAPEUTIC EXERCISES: CPT

## 2021-04-05 PROCEDURE — 97140 MANUAL THERAPY 1/> REGIONS: CPT

## 2021-04-05 NOTE — PROGRESS NOTES
PT DAILY TREATMENT NOTE 11    Patient Name: Basia Flores  Date:2021  : 1980  [x]  Patient  Verified  Payor: Jeanne Hayes / Plan: [place] [employer] [place] / Product Type: PPO /    In time:231  Out time:321  Total Treatment Time (min): 50  Visit #: 5 of 8    Treatment Area: Thoracic outlet syndrome [G54.0]    SUBJECTIVE  Pain Level (0-10 scale): 5  Any medication changes, allergies to medications, adverse drug reactions, diagnosis change, or new procedure performed?: [x] No    [] Yes (see summary sheet for update)  Subjective functional status/changes:   [] No changes reported  Patient reports things are \"the same\".      OBJECTIVE    Modality rationale: decrease pain, increase tissue extensibility and increase muscle contraction/control to improve the patients ability to perform functional mobility with greater ease    Min Type Additional Details    [] Estim:  []Unatt       []IFC  []Premod                        []Other:  []w/ice   []w/heat  Position:  Location:    [] Estim: []Att    []TENS instruct  []NMES                    []Other:  []w/US   []w/ice   []w/heat  Position:  Location:   10 [x]  Traction: [x] Cervical       []Lumbar                       [] Prone          [x]Supine                       []Intermittent   [x]Continuous Lbs: 30  [] before manual  [x] after manual    []  Ultrasound: []Continuous   [] Pulsed                           []1MHz   []3MHz W/cm2:  Location:    []  Iontophoresis with dexamethasone         Location: [] Take home patch   [] In clinic    []  Ice     []  heat  []  Ice massage  []  Laser   []  Anodyne Position:  Location:    []  Laser with stim  []  Other:  Position:  Location:    []  Vasopneumatic Device Pressure:       [] lo [] med [] hi   Temperature: [] lo [] med [] hi   [] Skin assessment post-treatment:  []intact []redness- no adverse reaction    []redness  adverse reaction:     32 min Therapeutic Exercise:  [x] See flow sheet :   Rationale: increase ROM, increase strength and improve coordination to improve the patients ability to perform ADLs and self care tasks with greater ease     8 min Manual Therapy:  Sitting - STM/DTM and TPR to right UT and levator scapulae    The manual therapy interventions were performed at a separate and distinct time from the therapeutic activities interventions. Rationale: decrease pain, increase ROM and increase tissue extensibility to perform functional mobility with greater ease        With   [] TE   [] TA   [] neuro   [] other: Patient Education: [x] Review HEP    [] Progressed/Changed HEP based on:   [] positioning   [] body mechanics   [] transfers   [] heat/ice application    [] other:      Other Objective/Functional Measures: trial of lower cervical spine static cervical traction attempted today to minimize numbness into left UE     Pain Level (0-10 scale) post treatment: 5    ASSESSMENT/Changes in Function: Patient able to tolerate prone scapular stabilization exercises in full prone today instead of half-prone. Addition of Ws well tolerated with minimal change in numbness to left UE. Trial of static cervical traction to lower cervical spine resulted in no change in numbness post-treatment. He reports greatest relief thus far was combination electrical stimulation and ultrasound to the left medial triceps. Patient with minimal to no carryover between therapy sessions regarding symptom relief. Patient will continue to benefit from skilled PT services to modify and progress therapeutic interventions, address functional mobility deficits, address ROM deficits, address strength deficits, analyze and address soft tissue restrictions, analyze and cue movement patterns, analyze and modify body mechanics/ergonomics, assess and modify postural abnormalities, address imbalance/dizziness and instruct in home and community integration to attain remaining goals.      [x]  See Plan of Care  []  See progress note/recertification  []  See Discharge Summary Progress towards goals / Updated goals:  Short Term Goals: To be accomplished in 1 weeks:              1. The pt will be I and compliant with HEP              IE- issued HEP              IYEHSHC: goal met 3/24/21     Long Term Goals: To be accomplished in 4 weeks:              1. Improve FOTO score to predicted outcome to improve ability for daily tasks.               IE- 40              2. The pt will report at least 50% reduction in left UE symptoms to improve ability for daily tasks              IE- constant numbness and pain   Current: No change 4/5/2021 - constant numbness              3. The pt will demonstrate at least 80# of left  strength to improve ability for ADL              IE- 35# avg of 2 trials              4.  The pt will demonstrate 55 degrees of left c/s rotation without pain for improved ability for ADL              IE- 45 degrees    PLAN  [x]  Upgrade activities as tolerated     []  Continue plan of care  []  Update interventions per flow sheet       []  Discharge due to:_  []  Other:_      Christiano Lowery, PT, DPT 4/5/2021  2:35 PM    Future Appointments   Date Time Provider Roc Gonsales   4/7/2021  3:00 PM Shira Lazo [facility]   4/12/2021  3:00 PM Jessica Yancey, PTA [facility]   4/14/2021  2:30 PM Patricia Gomez [facility]

## 2021-04-07 ENCOUNTER — HOSPITAL ENCOUNTER (OUTPATIENT)
Dept: PHYSICAL THERAPY | Age: 41
Discharge: HOME | End: 2021-04-07
Payer: COMMERCIAL

## 2021-04-07 PROCEDURE — 97140 MANUAL THERAPY 1/> REGIONS: CPT

## 2021-04-07 PROCEDURE — 97032 APPL MODALITY 1+ESTIM EA 15: CPT

## 2021-04-07 PROCEDURE — 97110 THERAPEUTIC EXERCISES: CPT

## 2021-04-07 NOTE — PROGRESS NOTES
PT DAILY TREATMENT NOTE     Patient Name: Marychuy Bray  Date:2021  : 1980  [x]  Patient  Verified  Payor: Sachin Clayton / [place]: [place] / Product Type: PPO /    In time: 303  Out time: 345  Total Treatment Time (min): 42  Visit #: 6 of 8    Treatment Area: Thoracic outlet syndrome [G54.0]    SUBJECTIVE  Pain Level (0-10 scale): 4  Any medication changes, allergies to medications, adverse drug reactions, diagnosis change, or new procedure performed?: [x] No    [] Yes (see summary sheet for update)  Subjective functional status/changes:   [] No changes reported  Patient reports nothing flared up after last visit but he has had minimal change since start of care.  Continues to report greatest relief with     OBJECTIVE    Modality rationale: decrease inflammation, decrease pain and increase tissue extensibility to improve the patients ability to perform functional mobility with greater ease    Min Type Additional Details    [] Estim:  []Unatt       []IFC  []Premod                        []Other:  []w/ice   []w/heat  Position:  Location:   8' [x] Estim: [x]Att    []TENS instruct  []NMES                    []Other:  [x]w/US   []w/ice   []w/heat  Position: supine   Location: medial left triceps     []  Traction: [] Cervical       []Lumbar                       [] Prone          []Supine                       []Intermittent   []Continuous Lbs:  [] before manual  [] after manual    []  Ultrasound: []Continuous   [] Pulsed                           []1MHz   []3MHz W/cm2:  Location:    []  Iontophoresis with dexamethasone         Location: [] Take home patch   [] In clinic    []  Ice     []  heat  []  Ice massage  []  Laser   []  Anodyne Position:  Location:    []  Laser with stim  []  Other:  Position:  Location:    []  Vasopneumatic Device Pressure:       [] lo [] med [] hi   Temperature: [] lo [] med [] hi   [] Skin assessment post-treatment:  []intact []redness- no adverse reaction    []redness  adverse reaction:     26 min Therapeutic Exercise:  [x] See flow sheet :   Rationale: increase ROM, increase strength and improve coordination to improve the patients ability to perform ADLs and self care tasks with greater ease     8 min Manual Therapy:  Supine - left first rib mobilization, STM to left upper trap and levator scap with right side flexion    The manual therapy interventions were performed at a separate and distinct time from the therapeutic activities interventions. Rationale: decrease pain, increase ROM and increase tissue extensibility to perform functional mobility with greater ease           With   [] TE   [] TA   [] neuro   [] other: Patient Education: [x] Review HEP    [] Progressed/Changed HEP based on:   [] positioning   [] body mechanics   [] transfers   [] heat/ice application    [] other:      Other Objective/Functional Measures: 60# average over 3 tests    Pain Level (0-10 scale) post treatment: 2    ASSESSMENT/Changes in Function: Patient able to complete all therex today without increase in numbness or paresthesia to left forearm/hand. Increased repetitions for horizontal abduction and scapular squeezes today. Patient with increased  strength on left, however deficit still noted. Patient reporting no numbness/paresthesia following first rib mob on left until bending forward and reaching for his shoe which brought symptoms back with no change. Ultrasound/E-stim combination to medial triceps continues to provide the most relief of symptoms.      Patient will continue to benefit from skilled PT services to modify and progress therapeutic interventions, address functional mobility deficits, address ROM deficits, address strength deficits, analyze and address soft tissue restrictions, analyze and cue movement patterns, analyze and modify body mechanics/ergonomics, assess and modify postural abnormalities, address imbalance/dizziness and instruct in home and community integration to attain remaining goals. [x]  See Plan of Care  []  See progress note/recertification  []  See Discharge Summary         Progress towards goals / Updated goals:  Short Term Goals: To be accomplished in 1 weeks:              7. The pt will be I and compliant with HEP              IE- issued HEP              EGMVQJY: goal met - 3/24/21     Long Term Goals: To be accomplished in 4 weeks:              1. Improve FOTO score to predicted outcome to improve ability for daily tasks.               IE- 40              2. The pt will report at least 50% reduction in left UE symptoms to improve ability for daily tasks              IE- constant numbness and pain              Current: No change 4/5/2021 - constant numbness              3. The pt will demonstrate at least 80# of left  strength to improve ability for ADL              IE- 35# avg of 2 trials   Current: Progressing 4/7/2021 - 60# average over 3 tests                4.  The pt will demonstrate 55 degrees of left c/s rotation without pain for improved ability for ADL              IE- 45 degrees    PLAN  [x]  Upgrade activities as tolerated     []  Continue plan of care  []  Update interventions per flow sheet       []  Discharge due to:_  []  Other:_      Brice April, PT, DPT 4/7/2021  3:05 PM    Future Appointments   Date Time Provider Roc Gonsales   4/12/2021  3:00 PM [facility]   4/14/2021  2:30 PM [facility]

## 2021-04-12 ENCOUNTER — HOSPITAL ENCOUNTER (OUTPATIENT)
Dept: PHYSICAL THERAPY | Age: 41
Discharge: HOME | End: 2021-04-12
Payer: COMMERCIAL

## 2021-04-12 PROCEDURE — 97032 APPL MODALITY 1+ESTIM EA 15: CPT

## 2021-04-12 PROCEDURE — 97140 MANUAL THERAPY 1/> REGIONS: CPT

## 2021-04-12 PROCEDURE — 97110 THERAPEUTIC EXERCISES: CPT

## 2021-04-12 NOTE — PROGRESS NOTES
PT DAILY TREATMENT NOTE     Patient Name: Peyton Paul  Date:2021  : 1980  [x]  Patient  Verified  Payor: Marco Antonio Keen / [place]: [place] [employer] / Product Type: PPO /    In time:3:00  Out time:3:42  Total Treatment Time (min): 42  Visit #: 7 of 8    Treatment Area: Thoracic outlet syndrome [G54.0]    SUBJECTIVE  Pain Level (0-10 scale): 4/10  Any medication changes, allergies to medications, adverse drug reactions, diagnosis change, or new procedure performed?: [x] No    [] Yes (see summary sheet for update)  Subjective functional status/changes:   [] No changes reported  Pt reports no change in function or pain. Pt reports compliance with HEP. OBJECTIVE    Modality rationale: decrease inflammation and decrease pain to improve the patients ability to perform ADLs with increased ease.     Min Type Additional Details    [] Estim:  []Unatt       []IFC  []Premod                        []Other:  []w/ice   []w/heat  Position:  Location:   8 [x] Estim: [x]Att    []TENS instruct  []NMES                    []Other:  [x]w/US   []w/ice   []w/heat  Position:supine  Location:medial left tricep    []  Traction: [] Cervical       []Lumbar                       [] Prone          []Supine                       []Intermittent   []Continuous Lbs:  [] before manual  [] after manual    []  Ultrasound: []Continuous   [] Pulsed                           []1MHz   []3MHz W/cm2:  Location:    []  Iontophoresis with dexamethasone         Location: [] Take home patch   [] In clinic    []  Ice     []  heat  []  Ice massage  []  Laser   []  Anodyne Position:  Location:    []  Laser with stim  []  Other:  Position:  Location:    []  Vasopneumatic Device Pressure:       [] lo [] med [] hi   Temperature: [] lo [] med [] hi   [] Skin assessment post-treatment:  []intact []redness- no adverse reaction    []redness  adverse reaction:     26 min Therapeutic Exercise:  [x] See flow sheet :   Rationale: increase ROM and increase strength to improve the patients ability to perform ADLs with increased ease. 8 min Manual Therapy:  Left first rib mobilization, STM to left UT and LS in supine. The manual therapy interventions were performed at a separate and distinct time from the therapeutic activities interventions. Rationale: decrease pain, increase ROM and increase tissue extensibility to improve tolerance to ADLs. With   [] TE   [] TA   [] neuro   [] other: Patient Education: [x] Review HEP    [] Progressed/Changed HEP based on:   [] positioning   [] body mechanics   [] transfers   [] heat/ice application    [] other:      Other Objective/Functional Measures:      Pain Level (0-10 scale) post treatment: 4/10    ASSESSMENT/Changes in Function: Pt has no change in symptoms post treatment. Pt demonstrates continued radicular symptoms to left hand. Discussed DC to return to MD next visit and patient agreed to plan. Patient will continue to benefit from skilled PT services to modify and progress therapeutic interventions, address functional mobility deficits, address ROM deficits, address strength deficits, analyze and address soft tissue restrictions, analyze and cue movement patterns and analyze and modify body mechanics/ergonomics to attain remaining goals. []  See Plan of Care  []  See progress note/recertification  []  See Discharge Summary         Progress towards goals / Updated goals:  Short Term Goals: To be accomplished in 1 weeks:              6. The pt will be I and compliant with HEP              IE- issued HEP              OHIJGNN: goal met - 3/24/21     Long Term Goals: To be accomplished in 4 weeks:              1. Improve FOTO score to predicted outcome to improve ability for daily tasks.               IE- 40              2.  The pt will report at least 50% reduction in left UE symptoms to improve ability for daily tasks              IE- constant numbness and pain              Current: No change 4/5/2021 - constant numbness              3. The pt will demonstrate at least 80# of left  strength to improve ability for ADL              IE- 35# avg of 2 trials              Current: Progressing 4/7/2021 - 60# average over 3 tests                4.  The pt will demonstrate 55 degrees of left c/s rotation without pain for improved ability for ADL              IE- 45 degrees    PLAN  []  Upgrade activities as tolerated     [x]  Continue plan of care  []  Update interventions per flow sheet       []  Discharge due to:_  []  Other:_      Carla Sessions, PTA 4/12/2021  3:03 PM    Future Appointments   Date Time Provider Roc Gonsales   4/14/2021  2:30 PM [facility]

## 2021-04-14 ENCOUNTER — HOSPITAL ENCOUNTER (OUTPATIENT)
Dept: PHYSICAL THERAPY | Age: 41
Discharge: HOME | End: 2021-04-14
Payer: COMMERCIAL

## 2021-04-14 PROCEDURE — 97112 NEUROMUSCULAR REEDUCATION: CPT

## 2021-04-14 PROCEDURE — 97110 THERAPEUTIC EXERCISES: CPT

## 2021-04-14 PROCEDURE — 97032 APPL MODALITY 1+ESTIM EA 15: CPT

## 2021-04-14 NOTE — PROGRESS NOTES
PT DISCHARGE DAILY NOTE AND [id number]  Patient name: Cyndi Pierson Start of Care: 3/16/2021   Referral source: Ofelia Miramontes MD : 1980                Medical Diagnosis: Thoracic outlet syndrome [G54.0]  Payor: Stuart Odell / Plan: Christine Russ PPO / Product Type: PPO /  Onset MTQB:3-1-28                Treatment Diagnosis: left UE numbness/pain    Prior Hospitalization: see medical history Provider#: [id number]   Medications: Verified on Patient summary List    Comorbidities: c/s pain, C2 fracture with surgery   Prior Level of Function: functionally I with daily activities, works as a  at the [employer]     Visits from [place] of Care: 8    Missed Visits: 1    Reporting Period : 3/16/2021 to 2021    Date:2021  : 1980  [x]  Patient  Verified  Payor: OPTIMA / Plan: [place] [employer] PPO / Product Type: PPO /    In time:2:29  Out time:3:10  Total Treatment Time (min): 41  Visit #: 8 of 8    SUBJECTIVE  Pain Level (0-10 scale): 4-5  Any medication changes, allergies to medications, adverse drug reactions, diagnosis change, or new procedure performed?: [x] No    [] Yes (see summary sheet for update)  Subjective functional status/changes:   [] No changes reported  \"Same tingling\"    OBJECTIVE    Modality rationale: increase tissue extensibility and increase muscle contraction/control to improve the patients ability to perform ADLs with less paresthesias   Min Type Additional Details    [] Estim:  []Unatt       []IFC  []Premod                        []Other:  []w/ice   []w/heat  Position:  Location:   6+2 [x] Estim: [x]Att    []TENS instruct  []NMES                    []Other:  [x]w/US   []w/ice   []w/heat  Position: supine  Location: medial triceps    []  Traction: [] Cervical       []Lumbar                       [] Prone          []Supine                       []Intermittent   []Continuous Lbs:  [] before manual  [] after manual    []  Ultrasound: []Continuous   [] Pulsed []1MHz   []3MHz W/cm2:  Location:    []  Iontophoresis with dexamethasone         Location: [] Take home patch   [] In clinic    []  Ice     []  heat  []  Ice massage  []  Laser   []  Anodyne Position:  Location:    []  Laser with stim  []  Other:  Position:  Location:    []  Vasopneumatic Device Pressure:       [] lo [] med [] hi   Temperature: [] lo [] med [] hi   [] Skin assessment post-treatment:  []intact []redness- no adverse reaction    []redness  adverse reaction:       25 min Therapeutic Exercise:  [] See flow sheet :   Rationale: increase ROM and increase strength to improve the patients ability to perform ADLs     8 min Neuromuscular Re-education:  []  See flow sheet :   Rationale: improve coordination and increase proprioception  to improve the patients ability to perform functional tasks with improved mechanics            With   [] TE   [] TA   [] neuro   [] other: Patient Education: [x] Review HEP    [] Progressed/Changed HEP based on:   [] positioning   [] body mechanics   [] transfers   [] heat/ice application    [] other:      Other Objective/Functional Measures:      Pain Level (0-10 scale) post treatment: 4    Summary of Care:  Short Term Goals: To be accomplished in 1 weeks:              8. The pt will be I and compliant with HEP              IE- issued HEP              VHCJPPS: goal met - 3/24/21     Long Term Goals: To be accomplished in 4 weeks:              1. Improve FOTO score to predicted outcome to improve ability for daily tasks.               IE- 40   Current: not met 40 4/14/2021              2. The pt will report at least 50% reduction in left UE symptoms to improve ability for daily tasks              IE- constant numbness and pain              Current: No change 4/5/2021 - constant numbness; not met pt reports \"same tingling\"              3.  The pt will demonstrate at least 80# of left  strength to improve ability for ADL              IE- 35# avg of 2 trials              YKVPNHG: Progressing 4/7/2021 - 60# average over 3 tests   not met 67# over 3 trials 4/14/2021              4. The pt will demonstrate 55 degrees of left c/s rotation without pain for improved ability for ADL              IE- 45 degrees   Current: near met 53 deg 4/14/2021    ASSESSMENT/Changes in Function: The pt reports little to no change in UE paresthesias. Minimal improvement in  strength, some improved cervical mobility noted.  Will D/C at this time for further MD follow up    Thank you for this referral!      PLAN  [x]Discontinue therapy: []Patient has reached or is progressing toward set goals      []Patient is non-compliant or has abdicated      [x]Due to lack of appreciable progress towards set goals    Loc Prabhakar DPT [facility] 4/14/2021  2:30 PM

## 2021-05-24 ENCOUNTER — OFFICE VISIT (OUTPATIENT)
Dept: ORTHOPEDIC SURGERY | Age: 41
End: 2021-05-24
Payer: COMMERCIAL

## 2021-05-24 VITALS
WEIGHT: 131 LBS | TEMPERATURE: 98 F | HEART RATE: 99 BPM | BODY MASS INDEX: 21.83 KG/M2 | OXYGEN SATURATION: 98 % | HEIGHT: 65 IN

## 2021-05-24 DIAGNOSIS — R20.2 NUMBNESS AND TINGLING IN LEFT HAND: ICD-10-CM

## 2021-05-24 DIAGNOSIS — M25.522 LEFT ELBOW PAIN: ICD-10-CM

## 2021-05-24 DIAGNOSIS — S12.100S CLOSED ODONTOID FRACTURE, SEQUELA: ICD-10-CM

## 2021-05-24 DIAGNOSIS — R20.0 NUMBNESS AND TINGLING IN LEFT HAND: ICD-10-CM

## 2021-05-24 DIAGNOSIS — G56.22 CUBITAL TUNNEL SYNDROME ON LEFT: Primary | ICD-10-CM

## 2021-05-24 PROCEDURE — 73080 X-RAY EXAM OF ELBOW: CPT | Performed by: SPECIALIST

## 2021-05-24 PROCEDURE — 99203 OFFICE O/P NEW LOW 30 MIN: CPT | Performed by: SPECIALIST
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
## 2021-06-09 ENCOUNTER — OFFICE VISIT (OUTPATIENT)
Dept: ORTHOPEDIC SURGERY | Age: 41
End: 2021-06-09
Payer: COMMERCIAL

## 2021-06-09 VITALS
BODY MASS INDEX: 21.16 KG/M2 | SYSTOLIC BLOOD PRESSURE: 111 MMHG | DIASTOLIC BLOOD PRESSURE: 73 MMHG | HEART RATE: 93 BPM | HEIGHT: 65 IN | OXYGEN SATURATION: 98 % | WEIGHT: 127 LBS | RESPIRATION RATE: 16 BRPM

## 2021-06-09 DIAGNOSIS — R20.2 NUMBNESS AND TINGLING IN LEFT HAND: ICD-10-CM

## 2021-06-09 DIAGNOSIS — R20.2 NUMBNESS AND TINGLING IN LEFT HAND: Primary | ICD-10-CM

## 2021-06-09 DIAGNOSIS — R20.0 NUMBNESS AND TINGLING IN LEFT HAND: ICD-10-CM

## 2021-06-09 DIAGNOSIS — R94.131 ABNORMAL EMG: ICD-10-CM

## 2021-06-09 DIAGNOSIS — R20.0 NUMBNESS AND TINGLING IN LEFT HAND: Primary | ICD-10-CM

## 2021-06-09 PROCEDURE — 95886 MUSC TEST DONE W/N TEST COMP: CPT | Performed by: PHYSICAL MEDICINE & REHABILITATION

## 2021-06-09 PROCEDURE — 95909 NRV CNDJ TST 5-6 STUDIES: CPT | Performed by: PHYSICAL MEDICINE & REHABILITATION
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
## 2021-06-11 ENCOUNTER — OFFICE VISIT (OUTPATIENT)
Dept: ORTHOPEDIC SURGERY | Age: 41
End: 2021-06-11
Payer: COMMERCIAL

## 2021-06-11 VITALS
HEART RATE: 91 BPM | RESPIRATION RATE: 15 BRPM | WEIGHT: 129 LBS | HEIGHT: 65 IN | BODY MASS INDEX: 21.49 KG/M2 | OXYGEN SATURATION: 98 %

## 2021-06-11 DIAGNOSIS — R20.2 NUMBNESS AND TINGLING IN LEFT HAND: Primary | ICD-10-CM

## 2021-06-11 DIAGNOSIS — G56.22 CUBITAL TUNNEL SYNDROME ON LEFT: ICD-10-CM

## 2021-06-11 DIAGNOSIS — R20.0 NUMBNESS AND TINGLING IN LEFT HAND: Primary | ICD-10-CM

## 2021-06-11 DIAGNOSIS — M25.522 LEFT ELBOW PAIN: ICD-10-CM

## 2021-06-11 PROCEDURE — 99213 OFFICE O/P EST LOW 20 MIN: CPT | Performed by: SPECIALIST
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
## 2021-06-17 ENCOUNTER — DOCUMENTATION ONLY (OUTPATIENT)
Dept: ORTHOPEDIC SURGERY | Age: 41
End: 2021-06-17

## 2021-06-17 NOTE — PROGRESS NOTES
Patient dropped off FMLA  Paperwork at hs office to be completed and cn be reached when ready for pickup.

## 2021-06-22 ENCOUNTER — OFFICE VISIT (OUTPATIENT)
Dept: ORTHOPEDIC SURGERY | Age: 41
End: 2021-06-22
Payer: COMMERCIAL

## 2021-06-22 VITALS
OXYGEN SATURATION: 99 % | HEIGHT: 65 IN | HEART RATE: 88 BPM | RESPIRATION RATE: 16 BRPM | BODY MASS INDEX: 21.49 KG/M2 | TEMPERATURE: 97.7 F | WEIGHT: 129 LBS

## 2021-06-22 DIAGNOSIS — G56.22 CUBITAL TUNNEL SYNDROME ON LEFT: Primary | ICD-10-CM

## 2021-06-22 PROCEDURE — 99214 OFFICE O/P EST MOD 30 MIN: CPT | Performed by: ORTHOPAEDIC SURGERY
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
## 2021-07-19 DIAGNOSIS — G56.22 CUBITAL TUNNEL SYNDROME ON LEFT: Primary | ICD-10-CM

## 2021-07-21 ENCOUNTER — OFFICE VISIT (OUTPATIENT)
Dept: ORTHOPEDIC SURGERY | Age: 41
End: 2021-07-21
Payer: COMMERCIAL

## 2021-07-21 VITALS
WEIGHT: 129 LBS | TEMPERATURE: 98.9 F | BODY MASS INDEX: 21.49 KG/M2 | HEART RATE: 84 BPM | SYSTOLIC BLOOD PRESSURE: 112 MMHG | HEIGHT: 65 IN | OXYGEN SATURATION: 100 % | DIASTOLIC BLOOD PRESSURE: 70 MMHG

## 2021-07-21 DIAGNOSIS — G56.22 CUBITAL TUNNEL SYNDROME ON LEFT: Primary | ICD-10-CM

## 2021-07-21 DIAGNOSIS — G89.18 POST-OP PAIN: ICD-10-CM

## 2021-07-21 PROCEDURE — 99024 POSTOP FOLLOW-UP VISIT: CPT | Performed by: ORTHOPAEDIC SURGERY

## 2021-07-21 RX ORDER — OXYCODONE AND ACETAMINOPHEN 5; 325 MG/1; MG/1
2 TABLET ORAL
Qty: 35 TABLET | Refills: 0 | Status: SHIPPED | OUTPATIENT
Start: 2021-07-21 | End: 2021-07-28

## 2021-07-21 NOTE — PROGRESS NOTES
History and Physical    Patient: Joni Garnett                MRN: [id number]       SSN: [id number]  YOB: 1980        AGE: 35 y.o. SEX: male  Body mass index is 21.47 kg/m². PCP: MAIKEL Winter  07/21/21   HISTORY AND PHYSICAL    CHIEF COMPLAINT: Left elbow pain/ numbness and tingling    HPI: Joni Garnett is a 35 y.o. male patient who returns to the office today for his left elbow. He continues to have left elbow pain as well as numbness and tingling that radiates down the ulnar distribution. He is set up for surgery next week. Past Medical History:   Diagnosis Date    Joint pain        No family history on file. Current Outpatient Medications   Medication Sig Dispense Refill    gabapentin (NEURONTIN) 300 mg capsule Take 1 Cap by mouth three (3) times daily. Max Daily Amount: 900 mg. 90 Cap 0    cholecalciferol, VITAMIN D3, (VITAMIN D3) 5,000 unit tab tablet Take  by mouth daily.  ibuprofen (MOTRIN) 600 mg tablet Take 1 Tab by mouth every six (6) hours as needed for Pain. (Patient not taking: Reported on 5/24/2021) 20 Tab 0    naproxen (NAPROSYN) 500 mg tablet Take 1 Tab by mouth two (2) times daily (with meals).  (Patient not taking: Reported on 5/24/2021) 20 Tab 0    methocarbamol (ROBAXIN) 750 mg tablet  (Patient not taking: Reported on 5/24/2021)  0       No Known Allergies    Past Surgical History:   Procedure Laterality Date    HX ORTHOPAEDIC      broken neck       Social History     Socioeconomic History    Marital status:      Spouse name: Not on file    Number of children: Not on file    Years of education: Not on file    Highest education level: Not on file   Occupational History    Not on file   Tobacco Use    Smoking status: Current Every Day Smoker    Smokeless tobacco: Never Used    Tobacco comment: 1 pack last 1 week   Substance and Sexual Activity    Alcohol use: Yes     Comment: occationaly    Drug use: Never    Sexual activity: Not on file   Other Topics Concern    Not on file   Social History Narrative    Not on file     Social Determinants of Health     Financial Resource Strain:     Difficulty of Paying Living Expenses:    Food Insecurity:     Worried About Running Out of Food in the Last Year:     [address] in the Last Year:    Transportation Needs:     Lack of Transportation (Medical):  Lack of Transportation (Non-Medical):    Physical Activity:     Days of Exercise per Week:     Minutes of Exercise per Session:    Stress:     Feeling of Stress :    Social Connections:     Frequency of Communication with Friends and Family:     Frequency of Social Gatherings with Friends and Family:     Attends Shinto Services:     Active Member of Clubs or Organizations:     Attends Club or Organization Meetings:     Marital Status:    Intimate Partner Violence:     Fear of Current or Ex-Partner:     Emotionally Abused:     Physically Abused:     Sexually Abused:        REVIEW OF SYSTEMS:      CON: negative for recent weight loss/gain, fever, or chills  EYE: negative for double or blurry vision  ENT: negative for hoarseness  RS:   negative for cough, URI, SOB  CV:  negative for chest pain, palpitations  GI:    negative for blood in stool, nausea/vomiting  :  negative for blood in urine  MS: As per HPI  Other systems reviewed and noted below. PHYSICAL EXAMINATION:  Visit Vitals  /70 (BP 1 Location: Left upper arm, BP Patient Position: Sitting, BP Cuff Size: Large adult)   Pulse 84   Temp 98.9 °F (37.2 °C) (Temporal)   Ht 5' 5\" (1.651 m)   Wt 129 lb (58.5 kg)   SpO2 100%   BMI 21.47 kg/m²     Body mass index is 21.47 kg/m². GENERAL: Alert and oriented x3, in no acute distress, well-developed, well-nourished. HEENT: Normocephalic, atraumatic. RESP: Non labored breathing with equal chest rise on inspiration. CV: Well perfused extremities. No cyanosis or clubbing noted.   ABDOMEN: Soft, non-tender, non-distended. Elbow Exam   R   L  ROM Active      Flexion   Full   Full   Extension  Full   Full   Pronation  Full   Full   Supination  Full   Full  ROM  Passive   Flexion   Full   Full   Extension  Full   Full   Pronation  Full   Full   Supination  Full   Full  Tenderness to palpation   Medial Epicondyle -   -   Lateral Epicondyle -   -  Tinel Sign Cubital Tunnel -   +  Ulnar Nerve Subluxation -   +  Distal Biceps Abnormality -   -  Triceps Abnormality  -   -  Other tenderness  -   -  Other Deformity  -   -  Olecranon Bursitis  -   -  Warmth   -   -  Erythema   -   -  Additional Findings: Ulnar nerve instability left elbow      IMAGING:  No imaging today    ASSESSMENT & PLAN  Diagnosis: Left cubital tunnel syndrome    Bindu Romero is here today for his upcoming left cubital tunnel release and anterior transposition history and physical visit. Surgery was discussed as well as recovery. Aftercare was discussed. Pain medication was prescribed. Surgical consent was obtained. He will follow-up in postoperative care. Electronically signed by: Joey Morgan MD    Note: This note was completed using voice recognition software.   Any typographical/name errors or mistakes are unintentional.

## 2021-08-04 ENCOUNTER — OFFICE VISIT (OUTPATIENT)
Dept: ORTHOPEDIC SURGERY | Age: 41
End: 2021-08-04
Payer: COMMERCIAL

## 2021-08-04 VITALS
WEIGHT: 135.2 LBS | HEIGHT: 65 IN | OXYGEN SATURATION: 100 % | RESPIRATION RATE: 16 BRPM | HEART RATE: 97 BPM | BODY MASS INDEX: 22.53 KG/M2 | TEMPERATURE: 97.8 F

## 2021-08-04 DIAGNOSIS — G56.22 CUBITAL TUNNEL SYNDROME ON LEFT: Primary | ICD-10-CM

## 2021-08-04 PROCEDURE — 99024 POSTOP FOLLOW-UP VISIT: CPT | Performed by: ORTHOPAEDIC SURGERY

## 2021-08-04 NOTE — LETTER
NOTIFICATION RETURN TO WORK / SCHOOL    8/4/2021 2:59 PM    Mr. Yue Steiner  [address] [phone]      To Whom It May Concern:    Yue Steiner is currently under the care of [address] [place] [address]. He will return to work/school on: 9/7/2021. No work at this time until then due to recent surgery on right elbow. If there are questions or concerns please have the patient contact our office.         Sincerely,      Kb Miranda MD

## 2021-08-04 NOTE — PROGRESS NOTES
Patient: Yahir Zamora                MRN: [id number]       SSN: [id number]  YOB: 1980        AGE: 35 y.o. SEX: male  Body mass index is 22.5 kg/m². PCP: Mae Sol  08/04/21    Chief Complaint: Left elbow follow up    Pain 4/10    HPI: Yahir Zamora is a 35 y.o. male patient who returns to the office today for his left elbow. He had a left elbow cubital tunnel release with anterior transposition last week. Overall he is doing well. He is starting to get some feeling back in his small and ring finger. Past Medical History:   Diagnosis Date    Joint pain        History reviewed. No pertinent family history. Current Outpatient Medications   Medication Sig Dispense Refill    ibuprofen (MOTRIN) 600 mg tablet Take 1 Tab by mouth every six (6) hours as needed for Pain. (Patient not taking: Reported on 5/24/2021) 20 Tab 0    gabapentin (NEURONTIN) 300 mg capsule Take 1 Cap by mouth three (3) times daily. Max Daily Amount: 900 mg. (Patient not taking: Reported on 8/4/2021) 90 Cap 0    naproxen (NAPROSYN) 500 mg tablet Take 1 Tab by mouth two (2) times daily (with meals). (Patient not taking: Reported on 5/24/2021) 20 Tab 0    methocarbamol (ROBAXIN) 750 mg tablet  (Patient not taking: Reported on 5/24/2021)  0    cholecalciferol, VITAMIN D3, (VITAMIN D3) 5,000 unit tab tablet Take  by mouth daily.  (Patient not taking: Reported on 8/4/2021)         No Known Allergies    Past Surgical History:   Procedure Laterality Date    HX ORTHOPAEDIC      broken neck       Social History     Socioeconomic History    Marital status:      Spouse name: Not on file    Number of children: Not on file    Years of education: Not on file    Highest education level: Not on file   Occupational History    Not on file   Tobacco Use    Smoking status: Current Every Day Smoker    Smokeless tobacco: Never Used    Tobacco comment: 1 pack last 1 week   Substance and Sexual Activity  Alcohol use: Yes     Comment: occationaly    Drug use: Never    Sexual activity: Not on file   Other Topics Concern    Not on file   Social History Narrative    Not on file     Social Determinants of Health     Financial Resource Strain:     Difficulty of Paying Living Expenses:    Food Insecurity:     Worried About Running Out of Food in the Last Year:     [address] in the Last Year:    Transportation Needs:     Lack of Transportation (Medical):  Lack of Transportation (Non-Medical):    Physical Activity:     Days of Exercise per Week:     Minutes of Exercise per Session:    Stress:     Feeling of Stress :    Social Connections:     Frequency of Communication with Friends and Family:     Frequency of Social Gatherings with Friends and Family:     Attends Anabaptism Services:     Active Member of Clubs or Organizations:     Attends Club or Organization Meetings:     Marital Status:    Intimate Partner Violence:     Fear of Current or Ex-Partner:     Emotionally Abused:     Physically Abused:     Sexually Abused:        REVIEW OF SYSTEMS:      No changes from previous review of systems unless noted. PHYSICAL EXAMINATION:  Visit Vitals  Pulse 97   Temp 97.8 °F (36.6 °C) (Temporal)   Resp 16   Ht 5' 5\" (1.651 m)   Wt 135 lb 3.2 oz (61.3 kg)   SpO2 100%   BMI 22.50 kg/m²     Body mass index is 22.5 kg/m². GENERAL: Alert and oriented x3, in no acute distress. HEENT: Normocephalic, atraumatic. RESP: Non labored breathing. SKIN: No rashes or lesions noted. Incision over his medial elbow is healing well. No drainage. No erythema. He has some stiffness and range of motion is is been in a splint for his elbow. He is neurovascular intact to most motor distally. He has some decrease sensation in the small finger of his left hand still.     IMAGING:  No imaging    ASSESSMENT & PLAN  Diagnosis: 1 week status post left cubital tunnel release with anterior transposition    We will splint was taken down. He can start to use his elbow for light activities. He will follow-up in 2 weeks. I have kept him out of work until September 7, 2021. Electronically signed by: Joni Rahman MD    Note: This note was completed using voice recognition software.   Any typographical/name errors or mistakes are unintentional.

## 2021-08-18 ENCOUNTER — OFFICE VISIT (OUTPATIENT)
Dept: ORTHOPEDIC SURGERY | Age: 41
End: 2021-08-18
Payer: COMMERCIAL

## 2021-08-18 VITALS
HEIGHT: 65 IN | BODY MASS INDEX: 21.76 KG/M2 | HEART RATE: 119 BPM | OXYGEN SATURATION: 99 % | WEIGHT: 130.6 LBS | TEMPERATURE: 97.2 F | RESPIRATION RATE: 16 BRPM

## 2021-08-18 DIAGNOSIS — G56.22 CUBITAL TUNNEL SYNDROME ON LEFT: Primary | ICD-10-CM

## 2021-08-18 PROCEDURE — 99024 POSTOP FOLLOW-UP VISIT: CPT | Performed by: ORTHOPAEDIC SURGERY

## 2021-08-18 NOTE — PROGRESS NOTES
Patient: Jesus Son                MRN: [id number]       SSN: [id number]  YOB: 1980        AGE: 35 y.o. SEX: male  Body mass index is 21.73 kg/m². PCP: MAIKEL Coates  08/18/21    Chief Complaint: Left elbow follow up    HPI: Jesus Son is a 35 y.o. male patient who follows up in the office today for his left elbow. He is 1 month from surgery. Numbness and tingling in the hand continues to improve. He does have some elbow stiffness. Past Medical History:   Diagnosis Date    Joint pain        No family history on file. No Known Allergies    Past Surgical History:   Procedure Laterality Date    HX ORTHOPAEDIC      broken neck    UPPER ARM/ELBOW SURGERY UNLISTED Left     2021       Social History     Socioeconomic History    Marital status:      Spouse name: Not on file    Number of children: Not on file    Years of education: Not on file    Highest education level: Not on file   Occupational History    Not on file   Tobacco Use    Smoking status: Current Every Day Smoker    Smokeless tobacco: Never Used    Tobacco comment: 1 pack last 1 week   Substance and Sexual Activity    Alcohol use: Yes     Comment: occationaly    Drug use: Never    Sexual activity: Not on file   Other Topics Concern    Not on file   Social History Narrative    Not on file     Social Determinants of Health     Financial Resource Strain:     Difficulty of Paying Living Expenses:    Food Insecurity:     Worried About Running Out of Food in the Last Year:     [address] in the Last Year:    Transportation Needs:     Lack of Transportation (Medical):      Lack of Transportation (Non-Medical):    Physical Activity:     Days of Exercise per Week:     Minutes of Exercise per Session:    Stress:     Feeling of Stress :    Social Connections:     Frequency of Communication with Friends and Family:     Frequency of Social Gatherings with Friends and Family:     Attends Caodaism Services:     Active Member of Clubs or Organizations:     Attends Club or Organization Meetings:     Marital Status:    Intimate Partner Violence:     Fear of Current or Ex-Partner:     Emotionally Abused:     Physically Abused:     Sexually Abused:        REVIEW OF SYSTEMS:      No changes from previous review of systems unless noted. PHYSICAL EXAMINATION:  Visit Vitals  Pulse (!) 119   Temp 97.2 °F (36.2 °C) (Temporal)   Resp 16   Ht 5' 5\" (1.651 m)   Wt 130 lb 9.6 oz (59.2 kg)   SpO2 99%   BMI 21.73 kg/m²     Body mass index is 21.73 kg/m². GENERAL: Alert and oriented x3, in no acute distress. HEENT: Normocephalic, atraumatic. RESP: Non labored breathing. SKIN: No rashes or lesions noted. Left elbow incision healed. Decreased sensation over the small finger    IMAGING:  None    ASSESSMENT & PLAN  Diagnosis: s/p left elbow cubital tunnel release with transposition    Continue with stretching of the left elbow. Expectant monitoring of return of sensation  Follow up in 1 month  Out of work until 9/29/2021      Electronically signed by: Jordi Guzman MD    Note: This note was completed using voice recognition software.   Any typographical/name errors or mistakes are unintentional.

## 2021-08-18 NOTE — LETTER
NOTIFICATION RETURN TO WORK / SCHOOL    8/18/2021 3:04 PM    Mr. Jo Ramos  [address] [place] [id number]      To Whom It May Concern:    Jo Ramos is currently under the care of [address] [place] [address]. He will return to work/school on: 9/29/2021    If there are questions or concerns please have the patient contact our office.         Sincerely,      Mirella Marsh MD

## 2021-08-25 ENCOUNTER — DOCUMENTATION ONLY (OUTPATIENT)
Dept: ORTHOPEDIC SURGERY | Age: 41
End: 2021-08-25

## 2021-09-22 ENCOUNTER — OFFICE VISIT (OUTPATIENT)
Dept: ORTHOPEDIC SURGERY | Age: 41
End: 2021-09-22
Payer: COMMERCIAL

## 2021-09-22 VITALS
OXYGEN SATURATION: 98 % | BODY MASS INDEX: 21.56 KG/M2 | HEART RATE: 101 BPM | TEMPERATURE: 97.6 F | RESPIRATION RATE: 16 BRPM | HEIGHT: 65 IN | WEIGHT: 129.4 LBS

## 2021-09-22 DIAGNOSIS — G56.22 CUBITAL TUNNEL SYNDROME ON LEFT: Primary | ICD-10-CM

## 2021-09-22 PROCEDURE — 99024 POSTOP FOLLOW-UP VISIT: CPT | Performed by: ORTHOPAEDIC SURGERY

## 2021-09-22 NOTE — PROGRESS NOTES
Patient: Austyn Kuhn                MRN: [id number]       SSN: [id number]  YOB: 1980        AGE: 35 y.o. SEX: male  Body mass index is 21.53 kg/m². PCP: MAIKEL Garnica  09/22/21    Chief Complaint: Left arm follow up    HPI: Austyn Kuhn is a 35 y.o. male patient who is now 2 months out from his left elbow cubital tunnel release. Overall he is doing well. He reports minimal if any pain. He does have some slight numbness over the small finger of the left hand that is also resolving. The numbness of his ring finger has resolved. Past Medical History:   Diagnosis Date    Joint pain        History reviewed. No pertinent family history. No Known Allergies    Past Surgical History:   Procedure Laterality Date    HX ORTHOPAEDIC      broken neck    UPPER ARM/ELBOW SURGERY UNLISTED Left     2021       Social History     Socioeconomic History    Marital status:      Spouse name: Not on file    Number of children: Not on file    Years of education: Not on file    Highest education level: Not on file   Occupational History    Not on file   Tobacco Use    Smoking status: Current Every Day Smoker    Smokeless tobacco: Never Used    Tobacco comment: 1 pack last 1 week   Substance and Sexual Activity    Alcohol use: Yes     Comment: occationaly    Drug use: Never    Sexual activity: Not on file   Other Topics Concern    Not on file   Social History Narrative    Not on file     Social Determinants of Health     Financial Resource Strain:     Difficulty of Paying Living Expenses:    Food Insecurity:     Worried About Running Out of Food in the Last Year:     [address] in the Last Year:    Transportation Needs:     Lack of Transportation (Medical):      Lack of Transportation (Non-Medical):    Physical Activity:     Days of Exercise per Week:     Minutes of Exercise per Session:    Stress:     Feeling of Stress :    Social Connections:     Frequency of Communication with Friends and Family:     Frequency of Social Gatherings with Friends and Family:     Attends Mosque Services:     Active Member of Clubs or Organizations:     Attends Club or Organization Meetings:     Marital Status:    Intimate Partner Violence:     Fear of Current or Ex-Partner:     Emotionally Abused:     Physically Abused:     Sexually Abused:        REVIEW OF SYSTEMS:      No changes from previous review of systems unless noted. PHYSICAL EXAMINATION:  Visit Vitals  Pulse (!) 101   Temp 97.6 °F (36.4 °C) (Temporal)   Resp 16   Ht 5' 5\" (1.651 m)   Wt 129 lb 6.4 oz (58.7 kg)   SpO2 98%   BMI 21.53 kg/m²     Body mass index is 21.53 kg/m². GENERAL: Alert and oriented x3, in no acute distress. HEENT: Normocephalic, atraumatic. RESP: Non labored breathing. SKIN: No rashes or lesions noted. Left elbow incision is healed. He has good strength and movement of his left hand. He does have some slight subjective numbness over the palmar pad of his small finger. IMAGING:  No imaging today    ASSESSMENT & PLAN  Diagnosis: Status post left cubital tunnel release with anterior transposition, 2 months    Tanvi Huggins is doing well. His numbness and tingling is resolving. He has minimal if any pain. At this point he will follow-up as needed      Electronically signed by: Asha Ann MD    Note: This note was completed using voice recognition software.   Any typographical/name errors or mistakes are unintentional.

## 2022-01-25 ENCOUNTER — OFFICE VISIT (OUTPATIENT)
Dept: ORTHOPEDIC SURGERY | Age: 42
End: 2022-01-25
Payer: COMMERCIAL

## 2022-01-25 VITALS
BODY MASS INDEX: 22.49 KG/M2 | HEIGHT: 65 IN | HEART RATE: 83 BPM | OXYGEN SATURATION: 97 % | TEMPERATURE: 98.2 F | WEIGHT: 135 LBS

## 2022-01-25 DIAGNOSIS — G56.22 CUBITAL TUNNEL SYNDROME ON LEFT: Primary | ICD-10-CM

## 2022-01-25 PROCEDURE — 99213 OFFICE O/P EST LOW 20 MIN: CPT | Performed by: ORTHOPAEDIC SURGERY

## 2022-01-25 NOTE — PROGRESS NOTES
Patient: Anitha Mccoy                MRN: [id number]       SSN: [id number]  YOB: 1980        AGE: 38 y.o. SEX: male  Body mass index is 22.47 kg/m². PCP: Mae Marino  01/25/22    Chief Complaint: Left elbow follow up    HPI: Anitha Mccoy is a 38 y.o. male patient who returns to the office today for his left elbow. He is now about 6 months out from his left cubital tunnel release. The numbness and tingling has persisted only in his small finger. He is having some difficulty with some things at work. Past Medical History:   Diagnosis Date    Joint pain        No family history on file. No Known Allergies    Past Surgical History:   Procedure Laterality Date    HX ORTHOPAEDIC      broken neck    UPPER ARM/ELBOW SURGERY UNLISTED Left     2021       Social History     Socioeconomic History    Marital status:      Spouse name: Not on file    Number of children: Not on file    Years of education: Not on file    Highest education level: Not on file   Occupational History    Not on file   Tobacco Use    Smoking status: Current Every Day Smoker    Smokeless tobacco: Never Used    Tobacco comment: 1 pack last 1 week   Substance and Sexual Activity    Alcohol use: Yes     Comment: occationaly    Drug use: Never    Sexual activity: Not on file   Other Topics Concern    Not on file   Social History Narrative    Not on file     Social Determinants of Health     Financial Resource Strain:     Difficulty of Paying Living Expenses: Not on file   Food Insecurity:     Worried About Running Out of Food in the Last Year: Not on file    Zach of Food in the Last Year: Not on file   Transportation Needs:     Lack of Transportation (Medical): Not on file    Lack of Transportation (Non-Medical):  Not on file   Physical Activity:     Days of Exercise per Week: Not on file    Minutes of Exercise per Session: Not on file   Stress:     Feeling of Stress : Not on file   Social Connections:     Frequency of Communication with Friends and Family: Not on file    Frequency of Social Gatherings with Friends and Family: Not on file    Attends Advent Services: Not on file    Active Member of Clubs or Organizations: Not on file    Attends Club or Organization Meetings: Not on file    Marital Status: Not on file   Intimate Partner Violence:     Fear of Current or Ex-Partner: Not on file    Emotionally Abused: Not on file    Physically Abused: Not on file    Sexually Abused: Not on file   Housing Stability:     Unable to Pay for Housing in the Last Year: Not on file    Number of [place] in the Last Year: Not on file    Unstable Housing in the Last Year: Not on file       REVIEW OF SYSTEMS:      No changes from previous review of systems unless noted. PHYSICAL EXAMINATION:  Visit Vitals  Pulse 83   Temp 98.2 °F (36.8 °C)   Ht 5' 5\" (1.651 m)   Wt 135 lb (61.2 kg)   SpO2 97%   BMI 22.47 kg/m²     Body mass index is 22.47 kg/m². GENERAL: Alert and oriented x3, in no acute distress. HEENT: Normocephalic, atraumatic. RESP: Non labored breathing. SKIN: No rashes or lesions noted.    Elbow Exam   R   L  ROM Active      Flexion   Full   Full   Extension  Full   Full   Pronation  Full   Full   Supination  Full   Full  ROM  Passive   Flexion   Full   Full   Extension  Full   Full   Pronation  Full   Full   Supination  Full   Full  Tenderness to palpation   Medial Epicondyle -   -   Lateral Epicondyle -   -  Tinel Sign Cubital Tunnel -   -  Ulnar Nerve Subluxation -   -  Distal Biceps Abnormality -   -  Triceps Abnormality  -   -  Other tenderness  -   -  Other Deformity  -   -  Olecranon Bursitis  -   -  Warmth   -   -  Erythema   -   -  Additional Findings: None    Decreased sensation subjectively over the small finger left hand    IMAGING:  No imaging today    ASSESSMENT & PLAN  Diagnosis: 6 months status post left cubital tunnel release    Godfrey Vogel continues to have some symptoms in the small finger but his other symptoms have resolved. Told him it can take up to a year sometimes longer to resolve the symptoms completely. I recommend he continue to be patient with this and continue to use the arm as tolerated. However I did put some work restrictions on him for the next 3 months to continue to protect the surgery. I will plan to see him back in about 3 months. He can continue to take over-the-counter anti-inflammatories as needed for the symptoms. Electronically signed by: Austyn Perry MD    Note: This note was completed using voice recognition software.   Any typographical/name errors or mistakes are unintentional.

## 2022-01-25 NOTE — LETTER
NOTIFICATION RETURN TO WORK / SCHOOL    1/25/2022 10:55 AM    Mr. Abbi Hdez  [address] [place] [phone]      To Whom It May Concern:    Abbi Hdez is currently under the care of [address] [place] [address]. He will return to work with the following restrictions on the left arm:  No repetitive use of the left arm  No lifting greater than 20 pounds with the left arm    These restrictions are in place until 4/26/2022. If there are questions or concerns please have the patient contact our office.         Sincerely,      Patricia Damico MD

## 2022-02-06 ENCOUNTER — HOSPITAL ENCOUNTER (EMERGENCY)
Age: 42
Discharge: HOME | End: 2022-02-06
Attending: STUDENT IN AN ORGANIZED HEALTH CARE EDUCATION/TRAINING PROGRAM
Payer: COMMERCIAL

## 2022-02-06 VITALS
SYSTOLIC BLOOD PRESSURE: 138 MMHG | RESPIRATION RATE: 18 BRPM | BODY MASS INDEX: 22.49 KG/M2 | HEART RATE: 87 BPM | HEIGHT: 65 IN | WEIGHT: 135 LBS | TEMPERATURE: 98.2 F | OXYGEN SATURATION: 100 % | DIASTOLIC BLOOD PRESSURE: 91 MMHG

## 2022-02-06 DIAGNOSIS — L02.31 ABSCESS OF BUTTOCK, RIGHT: Primary | ICD-10-CM

## 2022-02-06 PROCEDURE — 99281 EMR DPT VST MAYX REQ PHY/QHP: CPT

## 2022-02-06 PROCEDURE — 75810000289 HC I&D ABSCESS SIMP/COMP/MULT

## 2022-02-06 RX ORDER — SULFAMETHOXAZOLE AND TRIMETHOPRIM 800; 160 MG/1; MG/1
1 TABLET ORAL 2 TIMES DAILY
Qty: 14 TABLET | Refills: 0 | Status: SHIPPED | OUTPATIENT
Start: 2022-02-06 | End: 2022-02-13

## 2022-02-06 RX ORDER — NAPROXEN 500 MG/1
500 TABLET ORAL 2 TIMES DAILY WITH MEALS
Qty: 20 TABLET | Refills: 0 | Status: SHIPPED | OUTPATIENT
Start: 2022-02-06

## 2022-02-06 NOTE — ED PROVIDER NOTES
EMERGENCY DEPARTMENT HISTORY AND PHYSICAL EXAM      Date: 2/6/2022  Patient Name: Sarah Walker    History of Presenting Illness     Chief Complaint   Patient presents with    Abscess       History Provided By: Patient    HPI: Sarah Walker, 38 y.o. male no significant PMHx presents ambulatory to the ED. Pt reports abscess to buttock x 2 days. Denies fever/chills, drainage. Patient has not taken anything for symptoms. Patient reports previous abscess about 5 to 7 years ago. There are no other complaints, changes, or physical findings at this time. PCP: Kapil Horton MD    No current facility-administered medications on file prior to encounter. No current outpatient medications on file prior to encounter. Past History     Past Medical History:  Past Medical History:   Diagnosis Date    Joint pain        Past Surgical History:  Past Surgical History:   Procedure Laterality Date    HX ORTHOPAEDIC      broken neck    UPPER ARM/ELBOW SURGERY UNLISTED Left     2021       Family History:  No family history on file. Social History:  Social History     Tobacco Use    Smoking status: Current Every Day Smoker    Smokeless tobacco: Never Used    Tobacco comment: 1 pack last 1 week   Substance Use Topics    Alcohol use: Yes     Comment: occationaly    Drug use: Never       Allergies:  No Known Allergies      Review of Systems   Review of Systems   Constitutional: Negative for chills and fever. HENT: Negative for facial swelling. Eyes: Negative for photophobia and visual disturbance. Respiratory: Negative for shortness of breath. Cardiovascular: Negative for chest pain. Gastrointestinal: Negative for abdominal pain, nausea and vomiting. Genitourinary: Negative for flank pain. Skin: Negative for color change, pallor, rash and wound. Abscess to buttock   Neurological: Negative for dizziness, weakness, light-headedness and headaches.    All other systems reviewed and are negative. Physical Exam   Physical Exam  Vitals and nursing note reviewed. Constitutional:       General: He is not in acute distress. Appearance: He is well-developed. Comments: Pt in NAD   HENT:      Head: Normocephalic and atraumatic. Eyes:      Conjunctiva/sclera: Conjunctivae normal.   Cardiovascular:      Rate and Rhythm: Normal rate and regular rhythm. Heart sounds: Normal heart sounds. Pulmonary:      Effort: Pulmonary effort is normal. No respiratory distress. Breath sounds: Normal breath sounds. Abdominal:      General: Bowel sounds are normal. There is no distension. Palpations: Abdomen is soft. Musculoskeletal:         General: Normal range of motion. Skin:     General: Skin is warm. Findings: No rash. Comments: 3 cm indurated area to right buttock with overlying erythema. No fluctuance or drainage. TTP. Neurological:      Mental Status: He is alert and oriented to person, place, and time. Psychiatric:         Behavior: Behavior normal.           Diagnostic Study Results     Labs -   No results found for this or any previous visit (from the past 12 hour(s)). Radiologic Studies -   No orders to display     CT Results  (Last 48 hours)    None        CXR Results  (Last 48 hours)    None          Medical Decision Making   I am the first provider for this patient. I reviewed the vital signs, available nursing notes, past medical history, past surgical history, family history and social history. Vital Signs-Reviewed the patient's vital signs. Patient Vitals for the past 12 hrs:   Temp Pulse Resp BP SpO2   02/06/22 1633 98.2 °F (36.8 °C) 87 18 (!) 138/91 100 %       Records Reviewed: Nursing Notes and Old Medical Records    Provider Notes (Medical Decision Making):   DDx: Abscess, Cyst, Cellulitis     39 yo M who presents with abscess to buttock. On exam, indurated area and TTP. Patient has no drainage.   Abscess drained a scant amount of purulent fluid. Packing placed. Will treat with antibiotic. Discussed returning in 2 days for packing removal. At time of discharge, pt non-toxic appearing in NAD. Pt stable for prompt outpatient follow-up with PCP 1 to 2 days. Patient given strict instructions to return if symptoms worsen. ED Course:   Initial assessment performed. The patients presenting problems have been discussed, and they are in agreement with the care plan formulated and outlined with them. I have encouraged them to ask questions as they arise throughout their visit. I&D Abcess Complex    Date/Time: 2/6/2022 5:44 PM  Performed by: MAIKEL Montez  Authorized by: MAIKEL Montez     Consent:     Consent obtained:  Verbal    Consent given by:  Patient    Risks discussed:  Bleeding, incomplete drainage, infection and pain    Alternatives discussed:  Delayed treatment  Location:     Type:  Abscess    Size:  3 cm    Location: right buttock. Pre-procedure details:     Skin preparation:  Betadine  Anesthesia (see MAR for exact dosages): Anesthesia method:  Local infiltration    Local anesthetic:  Lidocaine 1% w/o epi  Procedure type:     Complexity:  Complex  Procedure details:     Incision types:  Single straight    Incision depth:  Dermal    Scalpel blade:  11    Wound management:  Probed and deloculated and irrigated with saline    Drainage:  Purulent    Drainage amount:  Scant    Wound treatment:  Wound left open    Packing materials:  1/4 in iodoform gauze    Amount 1/4\" iodoform:  5 cm  Post-procedure details:     Patient tolerance of procedure: Tolerated well, no immediate complications        Disposition:  5:45 PM  Discussed dx and treatment plan. Discussed importance of PCP follow up. All questions answered. Pt voiced they understood. Return if sx worsen. PLAN:  1.    Current Discharge Medication List      START taking these medications    Details   trimethoprim-sulfamethoxazole (Bactrim DS) 160-800 mg per tablet Take 1 Tablet by mouth two (2) times a day for 7 days. Qty: 14 Tablet, Refills: 0  Start date: 2/6/2022, End date: 2/13/2022      naproxen (NAPROSYN) 500 mg tablet Take 1 Tablet by mouth two (2) times daily (with meals). Qty: 20 Tablet, Refills: 0  Start date: 2/6/2022           2. Follow-up Information     Follow up With Specialties Details Why [address] [place] [address]  Schedule an appointment as soon as possible for a visit in 1 day  Inés Saldaña 3  [facility] [address] [place], [place] [address]    [facility] EMERGENCY DEPT Emergency Medicine In 2 days For wound re-check and packing removal [address] [facility], [place] [address]  [phone]        Return to ED if worse     Diagnosis     Clinical Impression:   1. Abscess of buttock, right        Attestations:    MAIKEL Paredes    Please note that this dictation was completed with [employer], the computer voice recognition software. Quite often unanticipated grammatical, syntax, homophones, and other interpretive errors are inadvertently transcribed by the computer software. Please disregard these errors. Please excuse any errors that have escaped final proofreading. Thank you.

## 2022-02-06 NOTE — Clinical Note
[address] [place] [address] Dr [facility] EMERGENCY DEPT  [address] [address] [phone]    Work/School Note    Date: 2/6/2022    To Whom It May concern:    Kris Coffman was seen and treated today in the emergency room by the following provider(s):  Attending Provider: Farrah Flor DO  Physician Assistant: MAIKEL Monae. Kris Coffman is excused from work/school on 2/6/2022 through 2/8/2022. He is medically clear to return to work/school on 2/9/2022.          Sincerely,          MAIKEL Tijerina

## 2022-02-08 ENCOUNTER — HOSPITAL ENCOUNTER (EMERGENCY)
Age: 42
Discharge: HOME | End: 2022-02-08
Attending: EMERGENCY MEDICINE
Payer: COMMERCIAL

## 2022-02-08 VITALS
BODY MASS INDEX: 22.49 KG/M2 | DIASTOLIC BLOOD PRESSURE: 87 MMHG | TEMPERATURE: 97.6 F | WEIGHT: 135 LBS | SYSTOLIC BLOOD PRESSURE: 118 MMHG | HEIGHT: 65 IN | HEART RATE: 90 BPM | RESPIRATION RATE: 18 BRPM | OXYGEN SATURATION: 97 %

## 2022-02-08 DIAGNOSIS — Z51.89 WOUND CHECK, ABSCESS: Primary | ICD-10-CM

## 2022-02-08 PROCEDURE — 99281 EMR DPT VST MAYX REQ PHY/QHP: CPT

## 2022-02-08 NOTE — ED PROVIDER NOTES
EMERGENCY DEPARTMENT HISTORY AND PHYSICAL EXAM    Date: (Not on file)  Patient Name: Joe Reeves    History of Presenting Illness     Chief Complaint   Patient presents with    Wound Check         History Provided By: Patient      Additional History (Context): Joe Reeves is a 38 y.o. male with No significant past medical history who presents with request for wound check to right buttocks; had abscess drained 2d ago. Packing fell out yesterday. Just picked up oral ABX yesterday. Took meds today. Denies fever, drainage, pain. PCP: Kapil Horton MD    Current Outpatient Medications   Medication Sig Dispense Refill    trimethoprim-sulfamethoxazole (Bactrim DS) 160-800 mg per tablet Take 1 Tablet by mouth two (2) times a day for 7 days. 14 Tablet 0    naproxen (NAPROSYN) 500 mg tablet Take 1 Tablet by mouth two (2) times daily (with meals). 20 Tablet 0       Past History     Past Medical History:  Past Medical History:   Diagnosis Date    Joint pain        Past Surgical History:  Past Surgical History:   Procedure Laterality Date    HX ORTHOPAEDIC      broken neck    UPPER ARM/ELBOW SURGERY UNLISTED Left     2021       Family History:  No family history on file. Social History:  Social History     Tobacco Use    Smoking status: Current Every Day Smoker    Smokeless tobacco: Never Used    Tobacco comment: 1 pack last 1 week   Substance Use Topics    Alcohol use: Yes     Comment: occationaly    Drug use: Never       Allergies:  No Known Allergies      Review of Systems   Review of Systems   Constitutional: Negative for fever. Skin: Positive for wound. Negative for color change. All Other Systems Negative  Physical Exam     Vitals:    02/08/22 1241   BP: 118/87   Pulse: 90   Resp: 18   Temp: 97.6 °F (36.4 °C)   SpO2: 97%   Weight: 61.2 kg (135 lb)   Height: 5' 5\" (1.651 m)     Physical Exam  Vitals and nursing note reviewed. Constitutional:       General: He is not in acute distress. Appearance: He is well-developed. He is not ill-appearing, toxic-appearing or diaphoretic. HENT:      Head: Normocephalic and atraumatic. Neck:      Thyroid: No thyromegaly. Vascular: No carotid bruit. Trachea: No tracheal deviation. Cardiovascular:      Rate and Rhythm: Normal rate and regular rhythm. Heart sounds: Normal heart sounds. No murmur heard. No friction rub. No gallop. Pulmonary:      Effort: Pulmonary effort is normal. No respiratory distress. Breath sounds: Normal breath sounds. No stridor. No wheezing or rales. Chest:      Chest wall: No tenderness. Abdominal:      General: There is no distension. Palpations: Abdomen is soft. There is no mass. Tenderness: There is no abdominal tenderness. There is no guarding or rebound. Musculoskeletal:         General: Normal range of motion. Cervical back: Normal range of motion and neck supple. Skin:     General: Skin is warm and dry. Coloration: Skin is not pale. Findings: Lesion present. Comments: Right buttocks: I&D site has mild mucopurulent drainage expressed w/moderate manipulation. No streaking, redness, or TTP. Neurological:      Mental Status: He is alert. Psychiatric:         Speech: Speech normal.         Behavior: Behavior normal.         Thought Content: Thought content normal.         Judgment: Judgment normal.          Diagnostic Study Results     Labs -   No results found for this or any previous visit (from the past 12 hour(s)). Radiologic Studies -   No orders to display     CT Results  (Last 48 hours)    None        CXR Results  (Last 48 hours)    None            Medical Decision Making   I am the first provider for this patient. I reviewed the vital signs, available nursing notes, past medical history, past surgical history, family history and social history. Vital Signs-Reviewed the patient's vital signs.     Records Reviewed: Nursing Notes    Procedures:  Procedures    Provider Notes (Medical Decision Making): I&D site appears to be healing well. Advised sitz baths, ABX adherence, warm compresses. MED RECONCILIATION:  No current facility-administered medications for this encounter. Current Outpatient Medications   Medication Sig    trimethoprim-sulfamethoxazole (Bactrim DS) 160-800 mg per tablet Take 1 Tablet by mouth two (2) times a day for 7 days.  naproxen (NAPROSYN) 500 mg tablet Take 1 Tablet by mouth two (2) times daily (with meals). Disposition:  home    DISCHARGE NOTE:   12:50 PM    Pt has been reexamined. Patient has no new complaints, changes, or physical findings. Care plan outlined and precautions discussed. Results of exam were reviewed with the patient. All medications were reviewed with the patient. All of pt's questions and concerns were addressed. Patient was instructed and agrees to follow up with PCP, as well as to return to the ED upon further deterioration. Patient is ready to go home. Follow-up Information     Follow up With Specialties Details Why [address] [place]  Schedule an appointment as soon as possible for a visit in 2 days  [facility] [address]  [phone]    [facility] EMERGENCY DEPT Emergency Medicine  If symptoms worsen return immediately [address] [place] [address]  [phone]          Current Discharge Medication List              Diagnosis     Clinical Impression:   1.  Wound check, abscess

## 2022-05-02 NOTE — PROGRESS NOTES
Murtaza Breaux  1980   Chief Complaint   Patient presents with    Elbow Pain     Left        HISTORY OF PRESENT ILLNESS  Murtaza Breaux is a 38 y.o. male who presents today for reevaluation of left elbow pain. He was previously seen by Dr. Abdi Rondon and being treated after his left elbow cubital tunnel release now 9 months ago. He has been on work restrictions which has helped his symptoms improve. He continues to have numbness and tingling of the 5th digit. This has improved since his last visit. The symptoms are worse when he has to lift heavy things. He rates his pain 5/10 today. Patient denies any fever, chills, chest pain, shortness of breath or calf pain. There are no new illness or injuries to report since last seen in the office. No changes in medications, allergies, social or family history. PHYSICAL EXAM:   Visit Vitals  Pulse 84   Temp 97.7 °F (36.5 °C) (Temporal)   Ht 5' 5\" (1.651 m)   Wt 132 lb (59.9 kg)   SpO2 95%   BMI 21.97 kg/m²     The patient is a well-developed, well-nourished male   in no acute distress. The patient is alert and oriented times three. Mood and affect are normal.  LYMPHATIC: lymph nodes are not enlarged and are within normal limits  SKIN: normal in color and non tender to palpation. There are no bruises or abrasions noted. NEUROLOGICAL: Motor sensory exam is within normal limits. Reflexes are equal bilaterally. There is normal sensation to pinprick and light touch  MUSCULOSKELETAL:    Examination Left Elbow   Skin Intact   Range of Motion 0-135   Tenderness Medial Epicondyle -   Tenderness Lateral Epicondyle -   Tenderness Olecranon Bursa -   Swelling -   Bruising -   Stability Normal   Motor Strength  Normal   Neurovascular Intact     Pos tinels and phanels at the cubital tunnel    IMAGING: no new imaging today    IMPRESSION:      ICD-10-CM ICD-9-CM    1. Cubital tunnel syndrome on left  G56.22 354.2    2.  S/P cubital tunnel release  Z98.890 V45.89         PLAN: Pt doing well post operatively  He continues to have some numbness and tingling in the 5th digit. This has improved since his last visit. We will keep him on the same restrictions at work since that seems to be helping his symptoms  He will continue OTC treatments for discomfort. He understands some times the nerve symptoms improve but do not fully resolve with this surgery. RTC 3 months and hopefully will put him on full duty at that time.         [facility] Specialist

## 2022-05-03 ENCOUNTER — OFFICE VISIT (OUTPATIENT)
Dept: ORTHOPEDIC SURGERY | Age: 42
End: 2022-05-03
Payer: COMMERCIAL

## 2022-05-03 VITALS
HEIGHT: 65 IN | OXYGEN SATURATION: 95 % | WEIGHT: 132 LBS | TEMPERATURE: 97.7 F | HEART RATE: 84 BPM | BODY MASS INDEX: 21.99 KG/M2

## 2022-05-03 DIAGNOSIS — Z98.890 S/P CUBITAL TUNNEL RELEASE: ICD-10-CM

## 2022-05-03 DIAGNOSIS — G56.22 CUBITAL TUNNEL SYNDROME ON LEFT: Primary | ICD-10-CM

## 2022-05-03 PROCEDURE — 99214 OFFICE O/P EST MOD 30 MIN: CPT | Performed by: ORTHOPAEDIC SURGERY

## 2022-05-03 NOTE — LETTER
NOTIFICATION RETURN TO WORK / SCHOOL    5/3/2022 2:35 PM    Mr. Steve Gilbert  [address] [address]      To Whom It May Concern:    Steve Gilbert is currently under the care of [address] [place] [address]. He will return to work with the following restrictions on the left arm:  No repetitive use of the left arm  No lifting greater than 20 pounds with the left arm    If there are questions or concerns please have the patient contact our office.         Sincerely,      MAIKEL Perry

## 2023-02-14 ENCOUNTER — CLINICAL DOCUMENTATION (OUTPATIENT)
Age: 43
End: 2023-02-14

## 2023-02-14 NOTE — PROGRESS NOTES
Patient dropped off Employment commission form to be completed at [facility] office.  Please give patient a call once ready to be picked up

## 2023-05-23 ENCOUNTER — HOSPITAL ENCOUNTER (EMERGENCY)
Facility: HOSPITAL | Age: 43
Discharge: HOME | End: 2023-05-24
Attending: EMERGENCY MEDICINE

## 2023-05-23 VITALS
TEMPERATURE: 98.3 F | BODY MASS INDEX: 24.16 KG/M2 | SYSTOLIC BLOOD PRESSURE: 125 MMHG | HEART RATE: 94 BPM | WEIGHT: 145 LBS | HEIGHT: 65 IN | DIASTOLIC BLOOD PRESSURE: 86 MMHG | RESPIRATION RATE: 16 BRPM | OXYGEN SATURATION: 98 %

## 2023-05-23 DIAGNOSIS — M25.561 ACUTE PAIN OF RIGHT KNEE: ICD-10-CM

## 2023-05-23 DIAGNOSIS — L03.115 CELLULITIS OF RIGHT KNEE: Primary | ICD-10-CM

## 2023-05-23 PROCEDURE — 99283 EMERGENCY DEPT VISIT LOW MDM: CPT

## 2023-05-23 RX ORDER — DOXYCYCLINE HYCLATE 100 MG
100 TABLET ORAL 2 TIMES DAILY
Qty: 14 TABLET | Refills: 0 | Status: SHIPPED | OUTPATIENT
Start: 2023-05-23 | End: 2023-05-30

## 2023-05-23 RX ORDER — NAPROXEN 500 MG/1
500 TABLET ORAL 2 TIMES DAILY WITH MEALS
Qty: 60 TABLET | Refills: 0 | Status: SHIPPED | OUTPATIENT
Start: 2023-05-23

## 2023-05-23 ASSESSMENT — ENCOUNTER SYMPTOMS
SINUS PRESSURE: 0
SORE THROAT: 0
NAUSEA: 0
TROUBLE SWALLOWING: 0
CONSTIPATION: 0
VOMITING: 0
ABDOMINAL DISTENTION: 0
APNEA: 0
BLOOD IN STOOL: 0
BACK PAIN: 0
FACIAL SWELLING: 0
SHORTNESS OF BREATH: 0
ABDOMINAL PAIN: 0
EYES NEGATIVE: 1
CHEST TIGHTNESS: 0
ANAL BLEEDING: 0
SINUS PAIN: 0
DIARRHEA: 0
RHINORRHEA: 0

## 2023-05-23 NOTE — DISCHARGE INSTRUCTIONS
Take medication as prescribed. Follow-up with your primary care physician within 2 days for reassessment. Bring the results from this visit with you for their review. Return to the ED immediately for any new, worsening, or persistent symptoms, including fever, vomiting, or any other medical concerns.
Yes

## 2023-05-23 NOTE — ED PROVIDER NOTES
Cervical back: Normal range of motion and neck supple. Right knee: Swelling and erythema present. No ecchymosis. Tenderness present. Skin:     General: Skin is warm. Findings: No rash. Neurological:      General: No focal deficit present. Mental Status: He is alert and oriented to person, place, and time. GCS: GCS eye subscore is 4. GCS verbal subscore is 5. GCS motor subscore is 6. Cranial Nerves: No cranial nerve deficit. Sensory: No sensory deficit. Motor: No weakness. Diagnostic Study Results     Labs -  No results found for this or any previous visit (from the past 12 hour(s)). EKG: All EKG's are interpreted by the Emergency Department Physician who either signs or Co-signs this chart in the absence of a cardiologist.    RADIOLOGY:   Non-plain film images such as CT, Ultrasound and MRI are read by the radiologist. Plain radiographic images are visualized and preliminarily interpreted by the emergency physician with the below findings:    Radiologic Studies -   No orders to display       The laboratory results, imaging results, and other diagnostic exams were reviewed in the EMR. Medical Decision Making   I am the first provider for this patient. I reviewed the vital signs, available nursing notes, past medical history, past surgical history, family history and social history. Vital Signs-Reviewed the patient's vital signs. Records Reviewed: Nursing Notes and Old Medical Records Personally, on initial evaluation (Time of Review: 7:30 PM)      ED Course: Progress Notes, Reevaluation, and Consults:  47-year-old male presents to the emergency department with complaints of right knee pain and swelling for the past 3 days. Currently rates pain 8 out of 10 describes as a throbbing pain. Has not taken any medications to relieve any of his symptoms. States tried a knee brace to see if it helps did not.    does have a history of gout and was unsure if

## 2023-05-23 NOTE — ED TRIAGE NOTES
Pt arrived with c/o of right knee pain and swelling x 3. Pt states he had gout last week in his toe and was prescribed allopurinol 300 mg. \"I don't know if it jumped from my toe to my knee. \" Denies any falls or injuries.

## 2024-03-05 NOTE — PROGRESS NOTES
PT DAILY TREATMENT NOTE  Patient Name: Steffen Rai Date:2019 : 1980 [x]  Patient  Verified Payor: Nina العلي / Plan: [address] [place] [address] Jason PT / Product Type: Commerical / In time:3:00  Out time:3:36 Total Treatment Time (min): 36 Visit #: 3 of 6 Treatment Area: Neck pain [M54.2] SUBJECTIVE Pain Level (0-10 scale): 0/10 Any medication changes, allergies to medications, adverse drug reactions, diagnosis change, or new procedure performed?: [x] No    [] Yes (see summary sheet for update) Subjective functional status/changes:   [] No changes reported \"Been feeling good, no pain today. \" OBJECTIVE 28 min Therapeutic Exercise:  [x] See flow sheet :  
Rationale: increase ROM, increase strength and increase proprioception to improve the patients ability to perform ADL's 
 
8 min Manual Therapy:  STM/DTM (B) UT, lev scap, C/S paraspinals. Rationale: decrease pain, increase ROM, increase tissue extensibility and decrease trigger points to improve ease with performing functional activities. With 
 [x] TE 
 [] TA 
 [] neuro 
 [] other: Patient Education: [x] Review HEP [] Progressed/Changed HEP based on:  
[] positioning   [] body mechanics   [] transfers   [] heat/ice application   
[] other:   
 
Other Objective/Functional Measures: Added prone Y 15 reps with 1#. 
 
Pain Level (0-10 scale) post treatment: 0/10 ASSESSMENT/Changes in Function: Pt reports having no pain today, performed full work shift. Continue PT to further improve posture awareness and mechanics to improve ease of performing work related tasks and functional activities. Patient will continue to benefit from skilled PT services to modify and progress therapeutic interventions, address functional mobility deficits, address ROM deficits, address strength deficits, analyze and address soft tissue restrictions and analyze and modify body mechanics/ergonomics to attain remaining goals. [x]  See Plan of Care 
[]  See progress note/recertification 
[]  See Discharge Summary Progress towards goals / Updated goals: 
Updated Goals: to be achieved in 3 weeks: 1. Patient will report FOTO score of 62 or better to indicate significant improvement in functional status.  
2. Pt will report pain no greater than 1/10 with work tasks to facilitate full return to work duties. - Pt reports having no pain today, performed full work shift. 5/20/2019 3. Pt will demonstrate neutral cervical and thoracic spine with QP exercises void of cuing to improve postural stability with ADLs. Met following initial cue for cervical elongation 5/17/19 PLAN 
[]  Upgrade activities as tolerated     [x]  Continue plan of care 
[]  Update interventions per flow sheet      
[]  Discharge due to:_ 
[]  Other:_   
 
Mohini Reddy PTA 5/20/2019  2:51 PM 
 
Future Appointments Date Time Provider Roc Gonsales 5/20/2019  3:00 PM Citlali Thurman [facility]  
5/24/2019  4:30 PM Patricia Sanchez [facility]  
5/28/2019  3:30 PM Citlali Thurman [facility]  
5/31/2019  4:00 PM Patricia Sanchez [facility]  
 
 
 
 
 no no no

## 2024-03-07 ENCOUNTER — HOSPITAL ENCOUNTER (OUTPATIENT)
Facility: HOSPITAL | Age: 44
Setting detail: RECURRING SERIES
Discharge: HOME | End: 2024-03-10
Payer: COMMERCIAL

## 2024-03-07 PROCEDURE — 97162 PT EVAL MOD COMPLEX 30 MIN: CPT

## 2024-03-07 NOTE — PROGRESS NOTES
PHYSICAL / OCCUPATIONAL THERAPY - DAILY TREATMENT NOTE     Patient Name: Brandt Dubose    Date: 3/7/2024    : 1980  Insurance: Payor: MICHI / Plan: [employer]HI POS / Product Type: *No Product type* /      Patient  verified Yes     Visit #   Current / Total 1 24   Time   In / Out 2:30 3:10   Pain   In / Out 3/10 2/10   Subjective Functional Status/Changes: See POC   Changes to:  Allergies, Med Hx, Sx Hx?   no       TREATMENT AREA =  Pain in left elbow [M25.522]    OBJECTIVE  Therapeutic Procedures:  Tx Min Billable or 1:1 Min (if diff from Tx Min) Procedure, Rationale, Specifics   7  [id number] Therapeutic Exercise (timed):  increase ROM, strength, coordination, balance, and proprioception to improve patient's ability to progress to PLOF and address remaining functional goals. (see flow sheet as applicable)    Details if applicable:       40  [facility] Totals Reminder: bill using total billable min of TIMED therapeutic procedures (example: do not include dry needle or estim unattended, both untimed codes, in totals to left)  8-22 min = 1 unit; 23-37 min = 2 units; 38-52 min = 3 units; 53-67 min = 4 units; 68-82 min = 5 units   Total Total     TOTAL TREATMENT TIME:        40     [x]  Patient Education billed concurrently with other procedures   [x] Review HEP    [] Progressed/Changed HEP, detail:    [] Other detail:       Objective Information/Functional Measures/Assessment    See POC    Patient will continue to benefit from skilled PT / OT services to modify and progress therapeutic interventions, analyze and address functional mobility deficits, analyze and address ROM deficits, analyze and address strength deficits, analyze and address soft tissue restrictions, analyze and cue for proper movement patterns, analyze and modify for postural abnormalities, and instruct in home and community integration to address functional deficits and attain remaining goals.    Progress toward goals / Updated goals:  []  See Progress

## 2024-03-07 NOTE — PROGRESS NOTES
[facility]  [address] [facility] [address] [place] [address] Ph:[phone] Fx: [phone]    PLAN OF CARE/ Statement of Necessity for Physical Therapy Services           Patient name: Brandt Dubose Start of Care: 3/7/2024   Referral source: Nettie Marroquin DO : 1980    Medical Diagnosis: Pain in left elbow [M25.522]       Onset Date: 2021   Treatment Diagnosis: M25.522  LEFT ELBOW PAIN                                     Prior Hospitalization: see medical history Provider#: [id number]   Medications: Verified on Patient Summary List     Comorbidities: Cubital tunnel release   Prior Level of Function: The patient states that he had improved ease of lifting and pulling prior to onset.    The Plan of Care and following information is based on the information from the initial evaluation.    Assessment / key information:  The patient is a 44 year old right handed male with a chief complaint of left elbow pain that injured his elbow when he was turning a bolt on a ship and slipped and slammed his left elbow. Following this he had a cubital tunnel release. Since he has experienced some numbness and a slight tremor of his hand and forearm as well as tightness of his left forearm that he feels swells at times. The patient has impairments consisting of pain, decreased flexibility, decreased strength, and decreased ease of ADLs. The patient will benefit from skilled PT in order to address remaining deficits.     Evaluation Complexity:  History:  MEDIUM  Complexity : 1-2 comorbidities / personal factors will impact the outcome/ POC ; Examination:  MEDIUM Complexity : 3 Standardized tests and measures addressin body structure, function, activity limitation and / or participation in recreation  ;Presentation:  MEDIUM Complexity : Evolving with changing characteristics  ;Clinical Decision Making:  MEDIUM Complexity : FOTO score of 26-74 FOTO score = an established

## 2024-03-27 ENCOUNTER — TELEPHONE (OUTPATIENT)
Facility: HOSPITAL | Age: 44
End: 2024-03-27

## 2024-04-03 ENCOUNTER — TELEPHONE (OUTPATIENT)
Facility: HOSPITAL | Age: 44
End: 2024-04-03

## 2024-04-25 ENCOUNTER — TELEPHONE (OUTPATIENT)
Facility: HOSPITAL | Age: 44
End: 2024-04-25